# Patient Record
Sex: FEMALE | Race: WHITE
[De-identification: names, ages, dates, MRNs, and addresses within clinical notes are randomized per-mention and may not be internally consistent; named-entity substitution may affect disease eponyms.]

---

## 2017-01-10 NOTE — ER DOCUMENT REPORT
ED Fall





- General


Chief Complaint: Fall Injury


Stated Complaint: FALL/LEFT KNEE AND BACK PAIN


Time seen by provider: 20:53


Mode of Arrival: Wheelchair


Information source: Patient


Notes: 


This is a 46-year-old female with a history of chronic pain arthritis diabetes 

mellitus type II COPD anxiety bipolar hypothyroidism that presents today with 

left knee pain.  She states that at 1630 this afternoon she was walking to the 

grocery store and tripped over a rock.  She fell on her left knee despite 

extending both hands.  She denies wrist pain hitting her head or loss of 

consciousness.  She takes no blood thinners.  She is also complaining of 

thoracic spine and cervical spine pain.  Neck pain is constant as well as knee 

pain. 


TRAVEL OUTSIDE OF THE [place] IN LAST 30 DAYS: No





- Related data


Allergies/Adverse Reactions: 


 





buspirone HCl [From BuSpar] Allergy (Verified 10/20/16 07:33)


 


tramadol [Tramadol] Allergy (Verified 10/20/16 07:33)


 











Past Medical History





- General


Information source: Patient





- Social History


Smoking Status: Current Every Day Smoker


Family History: Reviewed & Not Pertinent


Patient has suicidal ideation: No


Patient has homicidal ideation: No





- Past Medical History


Cardiac Medical History: Reports: Hx Hypercholesterolemia, Hx Hypertension


   Denies: Hx Pulmonary Embolism


Pulmonary Medical History: Reports: Hx Bronchitis, Hx COPD


   Denies: Hx Tuberculosis


Endocrine Medical History: Reports: Hx Diabetes Mellitus Type 2, Hx 

Hypothyroidism


Musculoskeltal Medical History: Reports Hx Arthritis - chronic neck, lower back

, and knee pain, Reports Hx Musculoskeletal Trauma


Skin Medical History: Reports Hx MRSA, Reports Hx Psoriasis


Psychiatric Medical History: Reports: Hx Anxiety, Hx Attention Deficit 

Hyperactivity Disorder, Hx Bipolar Disorder, Hx Depression


Infectious Medical History: Reports: Hx MRSA


Past Surgical History: Reports: Hx  Section, Hx Cholecystectomy, Hx 

Gynecologic Surgery - ectopic pregnancy repair, Hx Orthopedic Surgery - carpal 

tunnel, Hx Tubal Ligation





- Immunizations


Immunizations up to date: Yes


Hx Diphtheria, Pertussis, Tetanus Vaccination: Yes





Review of Systems





- Review of Systems


Constitutional: denies: Chills, Fever


EENT: denies: Blurred vision


Cardiovascular: denies: Chest pain


Respiratory: denies: Cough, Hurts to breathe


Gastrointestinal: denies: Abdomen distended, Abdominal pain


Genitourinary: denies: Dysuria


Female Genitourinary: No symptoms reported


Musculoskeletal: See HPI


Skin: No symptoms reported


Hematologic/Lymphatic: No symptoms reported


Neurological/Psychological: No symptoms reported





Physical Exam





- Vital signs


Vitals: 


 











Temp Pulse Resp BP Pulse Ox


 


 98.0 F   113 H  18   125/85   100 


 


 01/10/17 18:49  01/10/17 18:49  01/10/17 18:49  01/10/17 18:49  01/10/17 18:49














- General


General appearance: Appears well, Alert


In distress: None





- HEENT


Head: Normocephalic, Atraumatic


Eyes: Normal


Conjunctiva: Normal





- Respiratory


Respiratory status: No respiratory distress.  No: Tachypnea


Breath sounds: Normal.  No: Rales, Rhonchi, Stridor, Wheezing





- Cardiovascular


Rhythm: Regular


Heart sounds: Normal auscultation





- Abdominal


Inspection: Normal


Bowel sounds: Normal


Tenderness: Nontender





- Back


Back: No: CVA tenderness





- Extremities


General upper extremity: Normal inspection


General lower extremity: Normal inspection - Peripheral pulses dorsalis pedis +

2 bilaterally normal sensation bilaterally





- Neurological


Cognition: Normal, Confused





- Psychological


Associated symptoms: Normal affect, Normal mood





- Skin


Skin Temperature: Warm


Skin Moisture: Dry


Skin Color: Normal





Course





- Re-evaluation


Re-evalutation: 





01/10/17 21:31


Patient's x-ray images results were shared with the patient.  She was given 

multiple opportunities to ask questions.  Patient had superficial abrasion to 

the left patellar region anterior.  I advised the patient to clean the wound at 

home.  Wound was not open or weeping nor bleeding.  I advised the patient to 

follow-up with primary care physician.





- Vital Signs


Vital signs: 


 











Temp Pulse Resp BP Pulse Ox


 


 98.2 F   95   18   144/72 H  96 


 


 01/10/17 21:45  01/10/17 21:45  01/10/17 21:45  01/10/17 21:45  01/10/17 21:45














Discharge





- Discharge


Clinical Impression: 


 Cervical spine pain, Thoracic spine pain





Left knee pain


Qualifiers:


 Chronicity: acute Qualified Code(s): M25.562 - Pain in left knee





Condition: Stable


Disposition: HOME, SELF-CARE


Additional Instructions: 


Return to the emergency department if symptoms worsen.  Follow-up with primary 

care physician as soon as possible.


Referrals: 


[facility] [Provider Group] - Follow up as needed

## 2017-01-10 NOTE — ER DOCUMENT REPORT
ED Medical Screen (RME)





- General


Chief Complaint: Fall Injury


Stated Complaint: FALL/LEFT KNEE AND BACK PAIN


Time seen by provider: 19:11


Mode of Arrival: Wheelchair


Information source: Patient


Notes: 


46-year-old female tripped over a rock and fell onto her bent left knee causing 

a bruise and an abrasion.  After she got up she developed pain in her neck and 

her upper back between her shoulder blades.


TRAVEL OUTSIDE OF THE [place] IN LAST 30 DAYS: No





- Related Data


Allergies/Adverse Reactions: 


 





buspirone HCl [From BuSpar] Allergy (Verified 10/20/16 07:33)


 


tramadol [Tramadol] Allergy (Verified 10/20/16 07:33)


 











Past Medical History





- Past Medical History


Cardiac Medical History: Reports: Hx Hypercholesterolemia, Hx Hypertension


   Denies: Hx Pulmonary Embolism


Pulmonary Medical History: Reports: Hx Bronchitis, Hx COPD


   Denies: Hx Tuberculosis


Endocrine Medical History: Reports: Hx Diabetes Mellitus Type 2, Hx 

Hypothyroidism


Musculoskeltal Medical History: Reports Hx Arthritis - chronic neck, lower back

, and knee pain, Reports Hx Musculoskeletal Trauma


Skin Medical History: Reports Hx MRSA, Reports Hx Psoriasis


Psychiatric Medical History: Reports: Hx Anxiety, Hx Attention Deficit 

Hyperactivity Disorder, Hx Bipolar Disorder, Hx Depression


Infectious Medical History: Reports: Hx MRSA


Past Surgical History: Reports: Hx  Section, Hx Cholecystectomy, Hx 

Gynecologic Surgery - ectopic pregnancy repair, Hx Orthopedic Surgery - carpal 

tunnel, Hx Tubal Ligation





- Immunizations


Immunizations up to date: Yes


Hx Diphtheria, Pertussis, Tetanus Vaccination: Yes





Physical Exam





- Vital signs


Vitals: 





 











Temp Pulse Resp BP Pulse Ox


 


 98.0 F   113 H  18   125/85   100 


 


 01/10/17 18:49  01/10/17 18:49  01/10/17 18:49  01/10/17 18:49  01/10/17 18:49














Course





- Vital Signs


Vital signs: 





 











Temp Pulse Resp BP Pulse Ox


 


 98.0 F   113 H  18   125/85   100 


 


 01/10/17 18:49  01/10/17 18:49  01/10/17 18:49  01/10/17 18:49  01/10/17 18:49

## 2017-01-29 NOTE — ER DOCUMENT REPORT
HPI





- HPI


Patient complains to provider of: foot pain


Onset: Other - Chronic, worse over the past 3 days


Onset/Duration: Worse


Quality of pain: Achy, Sharp


Pain Level: 3


Context: 


Patient complains of chronic foot pain that she suspects she may have diabetic 

neuropathy although has not been told that she does.  Patient states she does 

do a lot of walking though has not had any increase in the amount of walking 

over the past 3 days.  Patient complains of bilateral foot pain to the tops of 

her feet.  This is pain that she has had for several years now.


Associated Symptoms: Other


Exacerbated by: Walking - Foot pain


Relieved by: Denies


Similar symptoms previously: Yes


Recently seen / treated by doctor: No





- ROS


ROS below otherwise negative: Yes


Systems Reviewed and Negative: Yes All other systems reviewed and negative





- CONSTITUTIONAL


Constitutional: DENIES: Fever, Chills





- NEURO


Neurology: DENIES: Weakness





- RESPIRATORY


Respiratory: DENIES: Coughing





- GASTROINTESTINAL


Gastrointestinal: DENIES: Nausea





- REPRODUCTIVE


Reproductive: DENIES: Pregnant:





- MUSCULOSKELETAL


Musculoskeletal: REPORTS: Extremity pain





- DERM


Skin Color: Flushed


Skin Problems: Rash





Past Medical History





- General


Information source: Patient





- Social History


Smoking Status: Current Every Day Smoker


Frequency of alcohol use: Occasional


Drug Abuse: None


Occupation: none


Lives with: Spouse/Significant other


Family History: Reviewed & Not Pertinent


Patient has suicidal ideation: No


Patient has homicidal ideation: No





- Past Medical History


Cardiac Medical History: Reports: Hx Hypercholesterolemia, Hx Hypertension


   Denies: Hx Pulmonary Embolism


Pulmonary Medical History: Reports: Hx Bronchitis, Hx COPD


   Denies: Hx Tuberculosis


Endocrine Medical History: Reports: Hx Diabetes Mellitus Type 2, Hx 

Hypothyroidism


Renal/ Medical History: Denies: Hx Peritoneal Dialysis


Musculoskeltal Medical History: Reports Hx Arthritis - chronic neck, lower back

, and knee pain, Reports Hx Musculoskeletal Trauma


Skin Medical History: Reports Hx MRSA, Reports Hx Psoriasis


Psychiatric Medical History: Reports: Hx Anxiety, Hx Attention Deficit 

Hyperactivity Disorder, Hx Bipolar Disorder, Hx Depression


Infectious Medical History: Reports: Hx MRSA


Past Surgical History: Reports: Hx  Section, Hx Cholecystectomy, Hx 

Gynecologic Surgery - ectopic pregnancy repair, Hx Orthopedic Surgery - carpal 

tunnel, Hx Tubal Ligation





- Immunizations


Immunizations up to date: Yes


Hx Diphtheria, Pertussis, Tetanus Vaccination: Yes





Vertical Provider Document





- CONSTITUTIONAL


Agree With Documented VS: Yes


Exam Limitations: No Limitations


General Appearance: WD/WN, No Apparent Distress, Obese - Morbidly obese





- INFECTION CONTROL


TRAVEL OUTSIDE OF THE [place] IN LAST 30 DAYS: No





- HEENT


HEENT: Atraumatic, Normocephalic





- NECK


Neck: Normal Inspection





- RESPIRATORY


Respiratory: Breath Sounds Normal, No Respiratory Distress


O2 Sat by Pulse Oximetry: 100





- CARDIOVASCULAR


Cardiovascular: Regular Rate, Regular Rhythm


Pulses: Normal: Dorsalis pedis





- MUSCULOSKELETAL/EXTREMETIES


Musculoskeletal/Extremeties: MAEW, Tender - Patient with tenderness to dorsal 

aspect of bilateral feet





- NEURO


Level of Consciousness: Awake, Alert, Appropriate


Motor/Sensory: No Motor Deficit





- DERM


Integumentary: Warm, Dry, Rash - Psoriasis affecting bilateral upper and lower 

extremities as well as scattered areas to trunk





Course





- Re-evaluation


Re-evalutation: 





17 11:19


Patient advised that she needs further management of her psoriasis as well as 

her chronic foot pain.  Patient encouraged to follow-up with her primary care 

provider for any chronic pain management.





- Vital Signs


Vital signs: 


 











Temp Pulse Resp BP Pulse Ox


 


 98.3 F   79   22 H  155/91 H  100 


 


 17 10:04  17 10:04  17 10:04  17 10:04  17 10:04














Discharge





- Discharge


Clinical Impression: 


 Hx of essential hypertension





Chronic foot pain


Qualifiers:


 Laterality: unspecified laterality Qualified Code(s): M79.673 - Pain in 

unspecified foot





Condition: Stable


Disposition: HOME, SELF-CARE


Instructions:  Chronic Pain Control ([facility]), Oral Narcotic Medication ([facility]), 

Psoriasis ([facility]), Topical Steroid Cream or Ointment ([facility])


Additional Instructions: 


Return immediately for any new or worsening symptoms





Followup with your primary care provider, call tomorrow to make a followup 

appointment





You may need further testing and treatment for your psoriasis, your primary 

doctor may consider placing you on oral medication to manage your symptoms.  

Call them Monday for an appointment.





Your doctor may need to adjust your blood pressure medication as it is mildly 

elevated today.  Recheck with your doctor in 2 days for recheck.








Prescriptions: 


Hydrocodone/Acetaminophen [Norco 5-325 Tablet] 1 each PO Q4 PRN #15 tablet


 PRN Reason: 


Triamcinolone Acetonide [Aristocort 0.1% Cream] 1 applic TP TID #60 gm


Referrals: 


[facility] [Provider Group] - Follow up as needed


GUSTAVO LUGO DO [ACTIVE STAFF] - Follow up as needed


[facility] [Provider Group] - Follow up tomorrow
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
## 2017-02-10 NOTE — ER DOCUMENT REPORT
ED General





- General


Chief Complaint: Foot Pain


Stated Complaint: FOOT PAIN


Notes: 


Patient is a 46 show female presents for complaint of bilateral foot pain.  She 

does have a history of diabetic neuropathy.  She used to be on gabapentin but 

has not had it for a year because she's been without her insurance.  She's been 

to ER in the past for her foot pain.  She says it's a sharp pain is worse 

whenever anything touches her foot or if she puts her foot down on the ground.  

It occurs in both feet.  Pain is constant.  She also has severe psoriasis.  She 

is on any current medications for psoriasis.  She does have an upcoming 

appointment in March with the treatment caring to [facility] clinic to get back on 

her medications.  She does have a history of diabetes.  She says her blood 

sugars are only mildly elevated.  She says they never get too high.  She's not 

currently on medications.  She says it is more diet control.  No fevers.  No 

infections.  She also complains that she had a homeless person staying with 

her.  She wants to make sure she does not have scabies.  She's not had any 

scabies tracks are bites.


TRAVEL OUTSIDE OF THE [place] IN LAST 30 DAYS: No





- Related Data


Allergies/Adverse Reactions: 


 





buspirone HCl [From BuSpar] Allergy (Verified 17 10:01)


 


tramadol [Tramadol] Allergy (Verified 17 10:01)


 











Past Medical History





- Social History


Smoking Status: Current Every Day Smoker


Chew tobacco use (# tins/day): No


Frequency of alcohol use: None


Drug Abuse: None


Family History: Reviewed & Not Pertinent


Patient has suicidal ideation: No


Patient has homicidal ideation: No





- Past Medical History


Cardiac Medical History: Reports: Hx Hypercholesterolemia, Hx Hypertension


   Denies: Hx Pulmonary Embolism


Pulmonary Medical History: Reports: Hx Bronchitis, Hx COPD


   Denies: Hx Tuberculosis


Endocrine Medical History: Reports: Hx Diabetes Mellitus Type 2, Hx 

Hypothyroidism


Renal/ Medical History: Denies: Hx Peritoneal Dialysis


Musculoskeltal Medical History: Reports Hx Arthritis - chronic neck, lower back

, and knee pain, Reports Hx Musculoskeletal Trauma


Skin Medical History: Reports Hx MRSA, Reports Hx Psoriasis


Psychiatric Medical History: Reports: Hx Anxiety, Hx Attention Deficit 

Hyperactivity Disorder, Hx Bipolar Disorder, Hx Depression


Infectious Medical History: Reports: Hx MRSA


Past Surgical History: Reports: Hx Abdominal Surgery - LAPRASCOPY, Hx  

Section, Hx Cholecystectomy, Hx Gynecologic Surgery - ectopic pregnancy repair, 

Hx Oral Surgery, Hx Orthopedic Surgery - carpal tunnel, Hx Tubal Ligation





- Immunizations


Immunizations up to date: Yes


Hx Diphtheria, Pertussis, Tetanus Vaccination: Yes





Review of Systems





- Review of Systems


Notes: 


My Normal Review Basic





REVIEW OF SYSTEMS:


CONSTITUTIONAL :  Denies fever,  chills, or sweats.  Denies recent illness.


RESPIRATORY:  Denies cough, cold, or chest congestion.  Denies shortness of 

breath, difficulty breathing, or wheezing.


GASTROINTESTINAL:  Denies abdominal pain.  Denies nausea, vomiting, or 

diarrhea.  Denies constipation.  Last BM: 


MUSCULOSKELETAL: Pain in feet.


SKIN: Psoriasis


NEUROLOGICAL:  Denies altered mental status or loss of consciousness.  Denies 

headache.  Denies weakness or paralysis or loss of use of either side.  Denies 

problems with gait or speech.  Denies sensory or motor loss.


ALL OTHER SYSTEMS REVIEWED AND NEGATIVE.





Physical Exam





- Vital signs


Vitals: 


 











Temp Pulse Resp BP Pulse Ox


 


 97.5 F   95   17   151/94 H  98 


 


 02/10/17 03:06  02/10/17 03:06  02/10/17 03:06  02/10/17 03:06  02/10/17 03:06














- Notes


Notes: 


General Appearance: Well nourished, alert, cooperative, no acute distress, 

moderate obvious discomfort.


Vitals: reviewed, See vital signs table.


Head: no swelling or tenderness to the head


Eyes: PERRL, EOMI, Conjuctiva clear


Mouth: No decreasd moisture


Extremities: strength 5/5 in all extremities, good pulses in all extremities, 

mild pain to palpation of bilateral feet., no edema.


Skin: Moderate severe psoriasis over extremities and face.  No redness 

concerning for cellulitis.  No lesions concerning for scabies.


Neuro: speech clear, oriented x 3, normal affect, responds appropriately to 

questions.





Course





- Vital Signs


Vital signs: 


 











Temp Pulse Resp BP Pulse Ox


 


 97.5 F   95   17   151/94 H  98 


 


 02/10/17 03:06  02/10/17 03:06  02/10/17 03:06  02/10/17 03:06  02/10/17 03:06














- Transfer of Care


Notes: 





02/10/17 05:02


Patient is feeling improved.  She still has some pain but sinus appears and 

was.  I will send her home with pain medication.  I will start her on 

gabapentin.  I will write prescription for a walker says she does not have to 

use much pressure on her feet and this should help her pain.  Patient 

encouraged to keep an appointment with drinking the clinic and to try to get 

into [facility] sooner if she does get the money to be able to see them.  

Patient encouraged return to ER shows worsening or symptoms.  Patient agrees 

with plan and will be discharged home.





Dictation of this chart was performed using voice recognition software; 

therefore, there may be some unintended grammatical errors.





Discharge





- Discharge


Clinical Impression: 


 Foot pain, bilateral





Condition: Good


Disposition: HOME, SELF-CARE


Instructions:  Oral Narcotic Medication ([facility])


Additional Instructions: 


Please take the medications as prescribed.  Please do not mix and Norco with 

the Percocet.  Please follow-up with your appointment with the caring to the 

clinic.  Please follow-up with your physician at [facility] earlier if you 

aren't able to get the money to see them quicker.  Please return to ER if you 

have fevers, worsening of your symptoms, or if you have further concerns.


Prescriptions: 


Gabapentin 300 mg PO BID #30 capsule


Oxycodone HCl/Acetaminophen [Percocet 5-325 mg Tablet] 1 tab PO Q4H PRN #10 

tablet


 PRN Reason: 


Triamcinolone Acetonide 80 gm TP DAILY #1 cream.gm.


Walker [Folding Walker] 1 each MC ASDIR PRN #1 each


 PRN Reason:

## 2017-02-19 NOTE — ER DOCUMENT REPORT
ED Extremity Problem, Upper





- General


Chief Complaint: Shoulder Pain


Stated Complaint: RIGHT ARM PAIN


Time seen by provider: 04:53


Mode of Arrival: Medic


Information source: Patient


Notes: 


46-year-old female presented to ED for right shoulder pain that she states she 

came minutes was seen for last week and they gave her ibuprofen and told to use 

ice packs.  She stated she did not get told to follow up with orthopedics.


TRAVEL OUTSIDE OF THE [place] IN LAST 30 DAYS: No





- HPI


Patient complains to provider of: Pain, Shoulder - Right


Onset: Other - She states more than a week


Recent injury: Possibly


Where: Home


Quality of pain: Sharp


Severity of pain: Moderate


Pain Level: 4


Associated symptoms: None


Exacerbated by: Movement


Relieved by: Nothing


Similar symptoms previously: Yes


Recently seen / treated by doctor: Yes





- Related Data


Allergies/Adverse Reactions: 


 





buspirone HCl [From BuSpar] Allergy (Verified 17 10:01)


 


tramadol [Tramadol] Allergy (Verified 17 10:01)


 











Past Medical History





- General


Information source: Patient





- Social History


Smoking Status: Current Every Day Smoker


Cigarette use (# per day): Yes - 1 ppd


Chew tobacco use (# tins/day): No


Frequency of alcohol use: Rare


Drug Abuse: None


Lives with: Family


Family History: Reviewed & Not Pertinent


Patient has suicidal ideation: No


Patient has homicidal ideation: No





- Past Medical History


Cardiac Medical History: Reports: Hx Hypercholesterolemia, Hx Hypertension


Pulmonary Medical History: Reports: Hx Bronchitis, Hx COPD


EENT Medical History: Reports: None


Neurological Medical History: Reports: None


Endocrine Medical History: Reports: Hx Diabetes Mellitus Type 2, Hx 

Hypothyroidism


Renal/ Medical History: Reports: None


Malignancy Medical History: Reports: None


GI Medical History: Reports: None


Musculoskeltal Medical History: Reports Hx Arthritis - chronic neck, lower back

, and knee pain, Reports Hx Musculoskeletal Trauma


Skin Medical History: Reports Hx MRSA, Reports Hx Psoriasis


Psychiatric Medical History: Reports: Hx Anxiety, Hx Attention Deficit 

Hyperactivity Disorder, Hx Bipolar Disorder, Hx Depression


Traumatic Medical History: Reports: None


Infectious Medical History: Reports: Hx MRSA


Past Surgical History: Reports: Hx Abdominal Surgery - LAPRASCOPY, Hx  

Section, Hx Cholecystectomy, Hx Gynecologic Surgery - ectopic pregnancy repair, 

Hx Oral Surgery, Hx Orthopedic Surgery - carpal tunnel, Hx Tubal Ligation





- Immunizations


Immunizations up to date: Yes


Hx Diphtheria, Pertussis, Tetanus Vaccination: Yes





Review of Systems





- Review of Systems


Constitutional: No symptoms reported


EENT: No symptoms reported


Cardiovascular: No symptoms reported


Respiratory: No symptoms reported


Gastrointestinal: No symptoms reported


Genitourinary: No symptoms reported


Female Genitourinary: No symptoms reported


Musculoskeletal: Other - Right shoulder pain


Skin: No symptoms reported


Hematologic/Lymphatic: No symptoms reported


Neurological/Psychological: No symptoms reported


-: Yes All other systems reviewed and negative





Physical Exam





- Vital signs


Vitals: 


 











Temp Pulse Resp BP Pulse Ox


 


 97.9 F   90   18   157/79 H  98 


 


 17 02:55  17 02:55  17 02:55  17 02:55  17 02:55











Interpretation: Normal





- General


General appearance: Appears well, Alert





- HEENT


Head: Normocephalic, Atraumatic


Eyes: Normal


Pupils: PERRL





- Respiratory


Respiratory status: No respiratory distress


Chest status: Nontender


Breath sounds: Normal


Chest palpation: Normal





- Cardiovascular


Rhythm: Regular


Heart sounds: Normal auscultation


Murmur: No





- Abdominal


Inspection: Normal


Distension: No distension


Bowel sounds: Normal


Tenderness: Nontender


Organomegaly: No organomegaly





- Back


Back: Normal, Nontender





- Extremities


General upper extremity: Normal inspection, Normal color, Normal temperature


General lower extremity: Normal inspection, Nontender, Normal color, Normal ROM

, Normal temperature, Normal weight bearing.  No: Deana's sign


Shoulder: Tender - To palpation, Limited ROM - Patient states it hurts with 

range of motion but is able to complete range of motion.





- Neurological


Neuro grossly intact: Yes


Cognition: Normal


Orientation: AAOx4


[place]ie Coma Scale Eye Opening: Spontaneous


[place] Coma Scale Verbal: Oriented


[place]ie Coma Scale Motor: Obeys Commands


[place] Coma Scale Total: 15


Speech: Normal


Motor strength normal: LUE, RUE, LLE, RLE


Sensory: Normal





- Psychological


Associated symptoms: Normal affect, Normal mood





- Skin


Skin Temperature: Warm


Skin Moisture: Dry


Skin Color: Normal





Course





- Re-evaluation


Re-evalutation: 





17 05:29


When patient was informed I cannot give her narcotics for her shoulder pain but 

that I could re-x-rayed if she needed to but she really needed to follow-up 

with orthopedics for her shoulder pain she got angry and stormed out cursing me 

in the emergency room.  She stated that nonetheless we were doing except as she 

was using foul language.  Offered her ibuprofen or Tylenol she told me she didn'

t want any that medicine as it did not do her any good.  When I asked pain she 

needed to go to orthopedics for follow-up with this she said she can go to 

orthopedic [facility].  I explained to her that I 

shoulder pain that was hurting as she described it needed an orthopedic 

consult.  She is discharged home she needs to follow-up with orthopedic but 

states she is not going to.  I explained to her our policy on chronic pain and 

she became angry.





- Vital Signs


Vital signs: 


 











Temp Pulse Resp BP Pulse Ox


 


 97.9 F   90   18   157/79 H  98 


 


 17 02:55  17 02:55  17 02:55  17 02:55  17 02:55














Discharge





- Discharge


Clinical Impression: 


Right shoulder pain


Qualifiers:


 Chronicity: chronic Qualified Code(s): M25.511 - Pain in right shoulder





Chronic pain


Qualifiers:


 Chronic pain type: chronic pain syndrome Qualified Code(s): G89.4 - Chronic 

pain syndrome





Disposition: HOME, SELF-CARE


Instructions:  Exercise Program for the Shoulder (Formerly [facility], [facility])


Additional Instructions: 


Shoulder Injury





     You have injured your shoulder.  This usually results from stretching or 

tearing of the tendons during trauma.  Time and protection are required in 

order to heal properly.  Many injuries are quite disabling, and should be taken 

seriously.


     Initial treatment includes cold packs and a sling to rest the shoulder.  

The physician has assessed the seriousness of your injury, and has outlined a 

treatment plan.  Understand that this treatment may change, depending on how 

you progress.


     If a re-examination was recommended, it is important that you follow up as 

instructed.  Some shoulder injuries (such as partial tear of the rotator cuff) 

are only suspected after you've failed to improve.


Call us if there's severe pain, numbness, or loss of function.





Chronic Pain Control





     Stress, inactivity, and depression make pain more severe regardless of the 

cause of the pain.  Stress and poor physical condition can cause pain such as 

headaches and backache.


     Relaxation:  Rest in a quiet place with your eyes closed for 20 minutes 

twice daily.  Concentrate on a pleasant image, or simply "feel" your breathing.

  Clear your mind.


     Stress management:  Deal with your "stressors."  Either take action, or 

eliminate the stressor from your life.  Don't let things hang over you.  Accept 

those things you can't change.


     Nutrition:  Eat small, balanced meals -- don't skip, don't overeat.  Meals 

should be high-carbohydrate, low-sugar, low-fat.


     Exercise:  Exercise helps painful conditions and eases stress. Get 30 

minutes of moderate exercise, five days a week. Do an activity that does not 

flare your pain.


     Precautions:  Pain which continues to disrupt daily activities, or which 

changes in nature, requires a medical evaluation.  Pain Clinic referral is 

available.





     


We do not manage chronic pain in the Emergency Department.  We will try to 

appropriately help you through an acute flare of your chronic painful condition

, but for on-going chronic pain that does not improve, you will need to see 

your private doctor or pain management specialist.  We do not provide repeated 

medication management of chronic painful conditions.  If you wish, we can 

provide the name of local pain management physicians.





Acetaminophen





     Acetaminophen may be taken for pain relief or fever control. It's much 

safer than aspirin, offering a wider range of "safe" dosages.  It is safe 

during pregnancy.  Some brand names are Tylenol, Panadol, Datril, Anacin 3, 

Tempra, and Liquiprin. Acetaminophen can be repeated every four hours.  The 

following are maximum recommended dosages:





WEIGHT         Dose             Drops                  Elixir        Chewable(

80mg)


(LBS.)                            drprs=droppers    tsp=teaspoon


6                 40 mg            .4 ml (1/2)


6-11            80 mg            .8 ml (full)            1/2   tsp           1 

      tab


12-16         120 mg           1 1/2 drprs            3/4   tsp           1 1/2

  tabs


17-23         160 mg             2  drprs              1      tsp           2  

     tabs


24-30         240 mg             3  drprs              1 1/2 tsp           3   

    tabs


30-35         320 mg                                     2       tsp           

4       tabs


36-41         360 mg                                     2 1/4 tsp           4 1

/2  tabs


42-47         400 mg                                     2 1/2 tsp           5 

      tabs


48-53         480 mg                                     3       tsp          6

       tabs


54-59         520 mg                                     3  1/4 tsp          6 1

/2 tabs


60-64         560 mg                                     3  1/2 tsp          7 

     tabs 


65-70         600 mg                                     3  3/4 tsp          7 1

/2 tabs


71-76         640 mg                                     4       tsp           

8      tabs


77-82         720 mg                                     4 1/2  tsp           9

      tabs


83-88         800 mg                                     5       tsp           

10     tabs





>89 pounds or adults          650 mg to 900 mg 





Acetaminophen can be repeated every four hours. Maximum daily dose not to 

exceed 4000 mg.





   These maximum recommended dosages are slightly higher than the dosages 

written on the product container, but these dosages are very safe and well 

below the toxic dosage for acetaminophen.








Ibuprofen





     Ibuprofen is an excellent, safe drug for pain control.  In addition, it 

has potent antiinflammatory effects which are beneficial, especially in the 

treatment of injuries, arthritis, or tendonitis. It's best to take ibuprofen 

with food.  Persons with ulcer disease or allergy to aspirin should notify 

their physician of this before taking ibuprofen.


     Take the medication exactly as prescribed.  Don't take additional doses 

unless instructed to do so by your doctor.  If you develop wheezing, shortness 

of breath, hives, faintness, stomach pain, vomiting, or dark black stools, 

return for re-evaluation at once.





Ice Packs





     Apply ice packs frequently against the painful area.  Many different 

schedules are recommended, such as "20 minutes on, 20 minutes off" or "one hour 

ice, two hours rest."  If you need to work, you may need to go longer between 

ice treatments.  You should plan to have the area ice packed AT LEAST one 

fourth of the time.


     The ice should be applied over the wrap, tape, or splint, or over a layer 

of cloth -- not directly against the skin.  Some ice bags have a built-in cloth 

and can be put directly on the skin.





FOLLOW-UP CARE:


If you have been referred to a physician for follow-up care, call the physician

s office for an appointment as you were instructed or within the next two days.

  If you experience worsening or a significant change in your symptoms, notify 

the physician immediately or return to the Emergency Department at any time for 

re-evaluation.


Forms:  Elevated Blood Pressure, Smoking Cessation Education


Referrals: 


ZOE VALENCIA MD [ACTIVE STAFF] - Follow up as needed

## 2017-03-01 NOTE — ER DOCUMENT REPORT
ED Medical Screen (RME)





- General


Chief Complaint: Foot Pain


Stated Complaint: FOOT PAIN


Notes: 


45 yo female with hx/o neuropathy.  hurts to walk.  ran out of [employer].  [facility] 

[facility]  + hx/o DM


TRAVEL OUTSIDE OF THE [place] IN LAST 30 DAYS: No





- Related Data


Allergies/Adverse Reactions: 


 





buspirone HCl [From BuSpar] Allergy (Verified 17 10:01)


 


tramadol [Tramadol] Allergy (Verified 17 10:01)


 











Past Medical History





- Social History


Chew tobacco use (# tins/day): No


Frequency of alcohol use: None


Drug Abuse: None





- Past Medical History


Cardiac Medical History: Reports: Hx Hypercholesterolemia, Hx Hypertension


   Denies: Hx Pulmonary Embolism


Pulmonary Medical History: Reports: Hx Bronchitis, Hx COPD


   Denies: Hx Tuberculosis


Endocrine Medical History: Reports: Hx Diabetes Mellitus Type 2, Hx 

Hypothyroidism


Renal/ Medical History: Denies: Hx Peritoneal Dialysis


Musculoskeltal Medical History: Reports Hx Arthritis - chronic neck, lower back

, and knee pain, Reports Hx Musculoskeletal Trauma


Skin Medical History: Reports Hx MRSA, Reports Hx Psoriasis


Psychiatric Medical History: Reports: Hx Anxiety, Hx Attention Deficit 

Hyperactivity Disorder, Hx Bipolar Disorder, Hx Depression


Infectious Medical History: Reports: Hx MRSA


Past Surgical History: Reports: Hx Abdominal Surgery - LAPRASCOPY, Hx  

Section, Hx Cholecystectomy, Hx Gynecologic Surgery - ectopic pregnancy repair, 

Hx Oral Surgery, Hx Orthopedic Surgery - carpal tunnel, Hx Tubal Ligation





- Immunizations


Immunizations up to date: Yes


Hx Diphtheria, Pertussis, Tetanus Vaccination: Yes





Physical Exam





- Vital signs


Vitals: 





 











Temp Pulse Resp BP Pulse Ox


 


 97.9 F   87   20   144/90 H  100 


 


 17 11:48  17 11:48  17 11:48  17 11:48  17 11:48














Course





- Vital Signs


Vital signs: 





 











Temp Pulse Resp BP Pulse Ox


 


 97.9 F   87   20   144/90 H  100 


 


 17 11:48  17 11:48  17 11:48  17 11:48  17 11:48

## 2017-03-03 NOTE — ER DOCUMENT REPORT
ED General





- General


Chief Complaint: Foot Pain


Stated Complaint: FOOT PAIN


Mode of Arrival: Wheelchair


Information source: Patient


Notes: 


46-year-old female history of diabetic neuropathy presents with complaints of 

bilateral foot pain.  Patient notes she ran out of Neurontin requests refill.  

Denies any other concerns.  Patient has follow-up with family doctor within 20 

days


Before meals notes her blood sugar is well-controlled


TRAVEL OUTSIDE OF THE [place] IN LAST 30 DAYS: No





- HPI


Onset: Other


Onset/Duration: Persistent


Quality of pain: Achy


Severity: Mild


Pain Level: 2


Associated symptoms: Other


Exacerbated by: Movement, Walking


Relieved by: Denies


Similar symptoms previously: No


Recently seen / treated by doctor: No





- Related Data


Allergies/Adverse Reactions: 


 





buspirone HCl [From BuSpar] Allergy (Verified 17 11:18)


 


tramadol [Tramadol] Allergy (Verified 17 11:18)


 











Past Medical History





- Social History


Smoking Status: Current Every Day Smoker


Cigarette use (# per day): Yes


Chew tobacco use (# tins/day): No


Smoking Education Provided: No


Frequency of alcohol use: None


Drug Abuse: None


Family History: Reviewed & Not Pertinent


Patient has suicidal ideation: No


Patient has homicidal ideation: No





- Past Medical History


Cardiac Medical History: Reports: Hx Hypercholesterolemia, Hx Hypertension


   Denies: Hx Pulmonary Embolism


Pulmonary Medical History: Reports: Hx Bronchitis, Hx COPD


   Denies: Hx Tuberculosis


Endocrine Medical History: Reports: Hx Diabetes Mellitus Type 2, Hx 

Hypothyroidism


Renal/ Medical History: Denies: Hx Peritoneal Dialysis


Musculoskeltal Medical History: Reports Hx Arthritis - chronic neck, lower back

, and knee pain, Reports Hx Musculoskeletal Trauma


Skin Medical History: Reports Hx MRSA, Reports Hx Psoriasis


Psychiatric Medical History: Reports: Hx Anxiety, Hx Attention Deficit 

Hyperactivity Disorder, Hx Bipolar Disorder, Hx Depression


Infectious Medical History: Reports: Hx MRSA


Past Surgical History: Reports: Hx Abdominal Surgery - LAPRASCOPY, Hx  

Section, Hx Cholecystectomy, Hx Gynecologic Surgery - ectopic pregnancy repair, 

Hx Oral Surgery, Hx Orthopedic Surgery - carpal tunnel, Hx Tubal Ligation





- Immunizations


Immunizations up to date: Yes


Hx Diphtheria, Pertussis, Tetanus Vaccination: Yes





Review of Systems





- Review of Systems


Notes: 


REVIEW OF SYSTEMS:


CONSTITUTIONAL :  Denies fever,  chills, or sweats.  Denies recent illness.


EENT:   Denies eye, ear, throat, or mouth pain or symptoms.  Denies nasal or 

sinus congestion or discharge.  Denies throat, tongue, or mouth swelling or 

difficulty swallowing.


CARDIOVASCULAR:  Denies chest pain.  Denies palpitations or racing or irregular 

heart beat.  Denies ankle edema.


RESPIRATORY:  Denies cough, cold, or chest congestion.  Denies shortness of 

breath, difficulty breathing, or wheezing.


GASTROINTESTINAL:  Denies abdominal pain or distention.  Denies nausea, vomiting

, or diarrhea.  Denies blood in vomitus, stools, or per rectum.  Denies black, 

tarry stools.  Denies constipation. 


GENITOURINARY:  Denies difficulty urinating, painful urination, burning, 

frequency, blood in urine, or discharge.


FEMALE  GENITOURINARY:  Denies vaginal bleeding, heavy or abnormal periods, 

irregular periods.  Denies vaginal discharge or odor. 


MUSCULOSKELETAL: Bilateral foot pain


SKIN: Extensive psoriasis


HEMATOLOGIC :   Denies easy bruising or bleeding.


LYMPHATIC:  Denies swollen, enlarged glands.


NEUROLOGICAL:  Denies confusion or altered mental status.  Denies passing out 

or loss of consciousness.  Denies dizziness or lightheadedness.  Denies 

headache.  Denies weakness or paralysis or loss of use of either side.  Denies 

problems with gait or speech.  Denies sensory loss, numbness, or tingling.  

Denies seizures.


PSYCHIATRIC:  Denies anxiety or stress.  Denies depression, suicidal ideation, 

or homicidal ideation.





ALL OTHER SYSTEMS REVIEWED AND NEGATIVE.








Dictation was performed using Dragon voice recognition software 








PHYSICAL EXAMINATION:





GENERAL: Well-appearing, well-nourished and in no acute distress.





HEAD: Atraumatic, normocephalic.





EYES: Pupils equal round extraocular movements intact,  conjunctiva are normal.





ENT: Nares patent





NECK: Normal range of motion





LUNGS: No respiratory distress





Musculoskeletal: Normal range of motion subjective pain in bilateral lower 

extremities





NEUROLOGICAL:  Normal speech, normal gait. 





PSYCH: Normal mood, normal affect.





SKIN: Extensive psoriasis noted with no secondary sign of infection





Physical Exam





- Vital signs


Vitals: 


 











Temp Pulse Resp BP Pulse Ox


 


 98.2 F   99   20   138/91 H  97 


 


 17 11:14  17 11:14  17 11:14  17 11:14  17 11:14














Course





- Re-evaluation


Re-evalutation: 


Patient was placed back on her Neurontin at her request.  She'll be given follow

-up with primary care physician











After performing a Medical Screening Examination, I estimate there is LOW risk 

for OPEN FRACTURE, COMPARTMENT SYNDROME, TENDON RUPTURE, ACUTE NEUROVASCULAR 

INJURY, or RETAINED FOREIGN BODY, thus I consider the discharge disposition 

reasonable. Also, there is no evidence or peritonitis, sepsis, or toxicity. The 

patient and I have discussed the diagnosis and risks, and we agree with 

discharging home with close follow-up with the understanding that symptoms and 

presentations can change. We also discussed returning to the Emergency 

Department immediately if new or worsening symptoms occur. We have discussed 

the symptoms which are most concerning (e.g., changing or worsening pain, fever

, numbness, weakness, cool or painful digits) that necessitate immediate return.





- Vital Signs


Vital signs: 


 











Temp Pulse Resp BP Pulse Ox


 


 98.2 F   88   18   140/87 H  99 


 


 17 11:14  17 12:22  17 12:22  17 12:22  17 12:22














Discharge





- Discharge


Clinical Impression: 


 Neuropathy





Diabetes


Qualifiers:


 Diabetes mellitus type: type 1 Diabetes mellitus complication status: without 

complication Qualified Code(s): E10.9 - Type 1 diabetes mellitus without 

complications





Condition: Stable


Disposition: HOME, SELF-CARE


Instructions:  Neuropathy ([facility])


Prescriptions: 


Gabapentin [Neurontin 300 mg Capsule] 300 mg PO Q12 #60 capsule


Referrals: 


[facility] [Provider Group] - Follow up as needed

## 2017-03-03 NOTE — ER DOCUMENT REPORT
ED Medical Screen (RME)





- General


Stated Complaint: FOOT PAIN


Time seen by provider: 11:12


Notes: 


Patient states she has neuropathy in both feet.  Has been seen in the emergency 

room several times and has been given gabapentin.  She is currently out of 

prescription.  Has appointment on the 16th with the [facility].  

States unable to bear weight on her feet because of the pain.  No new injury.





I have greeted and performed a rapid initial assessment of this patient.  A 

comprehensive ED assessment and evaluation of the patient, analysis of test 

results and completion of the medical decision making process will be conducted 

by additional ED providers.


TRAVEL OUTSIDE OF THE [place] IN LAST 30 DAYS: No





- Related Data


Allergies/Adverse Reactions: 


 





buspirone HCl [From BuSpar] Allergy (Verified 17 11:18)


 


tramadol [Tramadol] Allergy (Verified 17 11:18)


 











Past Medical History





- Past Medical History


Cardiac Medical History: Reports: Hx Hypercholesterolemia, Hx Hypertension


   Denies: Hx Pulmonary Embolism


Pulmonary Medical History: Reports: Hx Bronchitis, Hx COPD


   Denies: Hx Tuberculosis


Endocrine Medical History: Reports: Hx Diabetes Mellitus Type 2, Hx 

Hypothyroidism


Renal/ Medical History: Denies: Hx Peritoneal Dialysis


Musculoskeltal Medical History: Reports Hx Arthritis - chronic neck, lower back

, and knee pain, Reports Hx Musculoskeletal Trauma


Skin Medical History: Reports Hx MRSA, Reports Hx Psoriasis


Psychiatric Medical History: Reports: Hx Anxiety, Hx Attention Deficit 

Hyperactivity Disorder, Hx Bipolar Disorder, Hx Depression


Infectious Medical History: Reports: Hx MRSA


Past Surgical History: Reports: Hx Abdominal Surgery - LAPRASCOPY, Hx  

Section, Hx Cholecystectomy, Hx Gynecologic Surgery - ectopic pregnancy repair, 

Hx Oral Surgery, Hx Orthopedic Surgery - carpal tunnel, Hx Tubal Ligation





- Immunizations


Immunizations up to date: Yes


Hx Diphtheria, Pertussis, Tetanus Vaccination: Yes





Physical Exam





- Vital signs


Vitals: 


 











Temp Pulse Resp BP Pulse Ox


 


 98.2 F   99   20   138/91 H  97 


 


 17 11:14  17 11:14  17 11:14  17 11:14  17 11:14














- Skin


Skin Temperature: Warm


Skin Moisture: Dry





Course





- Vital Signs


Vital signs: 


 











Temp Pulse Resp BP Pulse Ox


 


 98.2 F   99   20   138/91 H  97 


 


 17 11:14  17 11:14  17 11:14  17 11:14  17 11:14

## 2017-03-12 NOTE — EKG REPORT
SEVERITY:- BORDERLINE ECG -

SINUS RHYTHM

BORDERLINE PROLONGED QT INTERVAL

:

Confirmed by: Aaron Rodrigez 12-Mar-2017 20:04:45
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
## 2017-03-19 NOTE — ER DOCUMENT REPORT
ED General





- General


Chief Complaint: Eye Problem


Stated Complaint: EYE SWELLING


Mode of Arrival: Ambulatory


Information source: Patient


Notes: 


This is a 46-year-old female with a history of diabetes psoriasis prior history 

of MRSA who presents with a painful swollen bump above her left eye.  She 

states that the bump was present for about a week but that last night she began 

to pick at it and tried to squeeze something out of it.  This morning when she 

awoke she noticed some edema to the area.  She denies any fevers chills or 

systemic symptoms.  She otherwise feels well.


TRAVEL OUTSIDE OF THE [place] IN LAST 30 DAYS: No





- Related Data


Allergies/Adverse Reactions: 


 





buspirone HCl [From BuSpar] Allergy (Verified 17 10:24)


 


tramadol [Tramadol] Allergy (Verified 17 10:24)


 











Past Medical History





- General


Information source: Patient





- Social History


Smoking Status: Current Every Day Smoker


Chew tobacco use (# tins/day): No


Frequency of alcohol use: None


Drug Abuse: None


Family History: Reviewed & Not Pertinent


Patient has suicidal ideation: No


Patient has homicidal ideation: No





- Past Medical History


Cardiac Medical History: Reports: Hx Hypercholesterolemia, Hx Hypertension


   Denies: Hx Pulmonary Embolism


Pulmonary Medical History: Reports: Hx Bronchitis, Hx COPD


   Denies: Hx Tuberculosis


Endocrine Medical History: Reports: Hx Diabetes Mellitus Type 2, Hx 

Hypothyroidism


Renal/ Medical History: Denies: Hx Peritoneal Dialysis


Musculoskeltal Medical History: Reports Hx Arthritis - chronic neck, lower back

, and knee pain, Reports Hx Musculoskeletal Trauma


Skin Medical History: Reports Hx MRSA, Reports Hx Psoriasis


Psychiatric Medical History: Reports: Hx Anxiety, Hx Attention Deficit 

Hyperactivity Disorder, Hx Bipolar Disorder, Hx Depression


Infectious Medical History: Reports: Hx MRSA


Past Surgical History: Reports: Hx Abdominal Surgery - LAPRASCOPY, Hx  

Section, Hx Cholecystectomy, Hx Gynecologic Surgery - ectopic pregnancy repair, 

Hx Oral Surgery, Hx Orthopedic Surgery - carpal tunnel, Hx Tubal Ligation





- Immunizations


Immunizations up to date: Yes


Hx Diphtheria, Pertussis, Tetanus Vaccination: Yes





Review of Systems





- Review of Systems


Constitutional: No symptoms reported.  denies: Chills, Fever


EENT: See HPI.  denies: Blurred vision, Tearing, Nose congestion, Sinus pressure


Cardiovascular: No symptoms reported.  denies: Chest pain, Dyspnea


Respiratory: No symptoms reported.  denies: Cough, Short of breath


Gastrointestinal: No symptoms reported


Genitourinary: No symptoms reported


Musculoskeletal: No symptoms reported


Skin: See HPI, Other - chronic psoriasis


Neurological/Psychological: No symptoms reported





Physical Exam





- Vital signs


Vitals: 


 











Temp Pulse Resp BP Pulse Ox


 


 98.5 F   106 H  21 H  151/110 H  98 


 


 17 10:27  17 10:27  17 10:27  17 10:27  17 10:27














- Notes


Notes: 


PHYSICAL EXAMINATION:





GENERAL: Obese adult female, mildly disheveled.  Alert and conversant and in no 

acute distress





HEAD: Atraumatic, normocephalic.





EYES: Pupils equal round and reactive to light, extraocular movements intact 

without pain, sclera anicteric, conjunctiva are normal.  There is a firm 

erythematous papule just to the lateral edge of left eyebrow with mild 

surrounding edema towards the eyelid.  No fluctuance..  No purulent drainage.  





ENT: nares patent, oropharynx clear without exudates.  Moist mucous membranes.





NECK: Normal range of motion, supple without lymphadenopathy





LUNGS: Breath sounds clear to auscultation bilaterally and equal.  No wheezes 

rales or rhonchi.





HEART: Regular rate and rhythm without murmurs





ABDOMEN: Soft, obese, nontender, normoactive bowel sounds.  No guarding, no 

rebound. .





EXTREMITIES: Normal range of motion, diffuse scaly rash consistent with 

psoriasis.





NEUROLOGICAL: Cranial nerves grossly intact.  Normal speech, normal gait.  No 

gross focal motor or sensory deficits appreciated.





PSYCH: Normal mood, normal affect.











Course





- Re-evaluation


Re-evalutation: 





No evidence today of abscess that is amenable to I&D.  In fact today the 

swelling is quite minimal.  However given the patient's history of diabetes and 

prior MRSA will treat with antibiotics and mupirocin.  Patient states that she 

[facility] however she has run out of her 

gabapentin for her diabetic neuropathy in her feet and she is requesting a 

refill today.





Strict return precautions were discussed to include fever and worsening 

swelling.  She is comfortable with the plan.








- Vital Signs


Vital signs: 


 











Temp Pulse Resp BP Pulse Ox


 


 98.5 F   96   17   147/77 H  97 


 


 17 10:27  17 13:23  17 13:23  17 13:23  17 13:23














Discharge





- Discharge


Clinical Impression: 


 Cellulitis of left eyelid





Condition: Stable


Disposition: HOME, SELF-CARE


Additional Instructions: 


CELLULITIS:





     You have an infection of your skin and underlying soft tissues called 

cellulitis.  This is due to bacteria, which can enter through any break in the 

skin, or even through an irritated hair follicle.  Untreated, cellulitis will 

usually worsen.


     Antibiotics are required.  Usually, warm packs or warm soaks, and 

elevation of the infected area are recommended.  You should start getting 

better within 24 to 36 hours.


     Most infections respond quickly to the right medication. Follow-up care is 

important, however, to check for abscess (boil) formation, unsuspected foreign 

body, or resistant infection.


     If you develop fever, chills, or if the area of infection is becoming 

rapidly more swollen or painful, call the doctor at once.








MRSA CELLULITIS:





     You have an infection of your skin and underlying soft tissues called 

cellulitis.  This is due to bacteria, which can enter through any break in the 

skin, or even through an irritated hair follicle.  Untreated, cellulitis will 

usually worsen and may form an abscess which requires draining.


     Although many bacterial organisms can cause cellulitis and abscess 

formations, the most likely bacteria is Methicillin-Resistant Staph Aureus, or 

MRSA for short.  Antibiotics are required.  Usually, warm packs or warm soaks, 

and elevation of the infected area are recommended.  You should start getting 

better within 24 to 36 hours.


     Most infections respond quickly to the right medication. Follow-up care is 

important, however, to check for abscess (boil) formation, unsuspected foreign 

body, or resistant infection.


     If you develop fever, chills, or if the area of infection is becoming 

rapidly more swollen or painful, call the doctor at once.








ANTIBIOTIC THERAPY:


     You have been given an antibiotic prescription.  It's important that you 

take all the medication, unless instructed otherwise by your physician.  

Failure to complete the entire course can result in relapse of your condition.


     Common side effects of antibiotics include nausea, intestinal cramping, or 

diarrhea.  Women may develop vaginal yeast infections, and babies can get yeast 

(thrush) in the mouth following the use of antibiotics.  Contact your physician 

if you develop significant side effects from this medication.


     Allergy to this antibiotic can result in hives, wheezing, faintness, or 

itching.  If symptoms of allergy occur, stop the medication and call the doctor.











DOXYCYCLINE:


     Doxycycline (Vibramycin, Doryx) is an antibiotic of the tetracycline 

family.  This type of drug is useful for infections of the respiratory tract 

and genital tract, and is sometimes used for intestinal infections.


     Unlike most tetracyclines, doxycycline can be taken with food.  It is 

longer acting, and (usually) less prone to side effects than regular 

tetracycline.


     Tetracycline antibiotics can stain immature teeth and SHOULD NOT BE TAKEN 

BY CHILDREN, NURSING MOTHERS, OR PREGNANT WOMEN.


     Tetracyclines can make you more prone to sunburn.  Abdominal cramping, 

nausea, and diarrhea are occasional side effects.  Women may experience vaginal 

yeast infections.


     Call the doctor at once if you develop hives, itching, shortness of breath

, or lightheadedness.














FOLLOW-UP CARE:


If you have been referred to a physician for follow-up care, call the physician

s office for an appointment as you were instructed or within the next two days.

  If you experience worsening or a significant change in your symptoms, notify 

the physician immediately or return to the Emergency Department at any time for 

re-evaluation.


Prescriptions: 


Doxycycline Hyclate 100 mg PO BID #20 capsule


Gabapentin 300 mg PO TID #60 capsule


Mupirocin 22 gm TP TID #1 oint..gm.


Forms:  Elevated Blood Pressure, Smoking Cessation Education

## 2017-03-19 NOTE — ER DOCUMENT REPORT
ED Medical Screen (RME)





- General


Stated Complaint: EYE SWELLING


Notes: 


h/o MRSA


periorbital swelling for one week over her left eye, she tried to pop it last 

night without release of fluid. She states she went to bed and woke up with 

pain and swelling at the site








evidence of tender, erythematous area around the eye brow


no evidence of globe injury, trauma, EOMI





I have greeted and performed a rapid initial assessment of this patient. A 

comprehensive ED assessment and evaluation of the patient, analysis of test 

results and completion of the medical decision making process will be conducted 

by additional ED providers.





TRAVEL OUTSIDE OF THE [place] IN LAST 30 DAYS: No





- Related Data


Allergies/Adverse Reactions: 


 





buspirone HCl [From BuSpar] Allergy (Verified 17 10:24)


 


tramadol [Tramadol] Allergy (Verified 17 10:24)


 











Past Medical History





- Past Medical History


Cardiac Medical History: Reports: Hx Hypercholesterolemia, Hx Hypertension


   Denies: Hx Pulmonary Embolism


Pulmonary Medical History: Reports: Hx Bronchitis, Hx COPD


   Denies: Hx Tuberculosis


Endocrine Medical History: Reports: Hx Diabetes Mellitus Type 2, Hx 

Hypothyroidism


Renal/ Medical History: Denies: Hx Peritoneal Dialysis


Musculoskeltal Medical History: Reports Hx Arthritis - chronic neck, lower back

, and knee pain, Reports Hx Musculoskeletal Trauma


Skin Medical History: Reports Hx MRSA, Reports Hx Psoriasis


Psychiatric Medical History: Reports: Hx Anxiety, Hx Attention Deficit 

Hyperactivity Disorder, Hx Bipolar Disorder, Hx Depression


Infectious Medical History: Reports: Hx MRSA


Past Surgical History: Reports: Hx Abdominal Surgery - LAPRASCOPY, Hx  

Section, Hx Cholecystectomy, Hx Gynecologic Surgery - ectopic pregnancy repair, 

Hx Oral Surgery, Hx Orthopedic Surgery - carpal tunnel, Hx Tubal Ligation





- Immunizations


Immunizations up to date: Yes


Hx Diphtheria, Pertussis, Tetanus Vaccination: Yes

## 2017-03-20 NOTE — ER DOCUMENT REPORT
ED Medical Screen (RME)





- General


Stated Complaint: EYEBROW IRRITATION


Time seen by provider: 16:37


Mode of Arrival: Medic


Information source: Patient


Notes: 


46-year-old female presents to ED for irritation to the left eyelid.  She 

states she had a abscess there and she's popped it and now she's got pain.  She'

s got a history of MRSA.  She has been seen recently for the same irritation.




















I have greeted and performed a rapid initial assessment of this patient.  A 

comprehensive ED assessment and evaluation of the patient, analysis of test 

results and completion of medical decision making process will be conducted by 

an additional ED providers.


TRAVEL OUTSIDE OF THE [place] IN LAST 30 DAYS: No





- Related Data


Allergies/Adverse Reactions: 


 





buspirone HCl [From BuSpar] Allergy (Verified 17 10:24)


 


tramadol [Tramadol] Allergy (Verified 17 10:24)


 











Past Medical History





- Past Medical History


Cardiac Medical History: Reports: Hx Hypercholesterolemia, Hx Hypertension


   Denies: Hx Pulmonary Embolism


Pulmonary Medical History: Reports: Hx Bronchitis, Hx COPD


   Denies: Hx Tuberculosis


Endocrine Medical History: Reports: Hx Diabetes Mellitus Type 2, Hx 

Hypothyroidism


Renal/ Medical History: Denies: Hx Peritoneal Dialysis


Musculoskeltal Medical History: Reports Hx Arthritis - chronic neck, lower back

, and knee pain, Reports Hx Musculoskeletal Trauma


Skin Medical History: Reports Hx MRSA, Reports Hx Psoriasis


Psychiatric Medical History: Reports: Hx Anxiety, Hx Attention Deficit 

Hyperactivity Disorder, Hx Bipolar Disorder, Hx Depression


Infectious Medical History: Reports: Hx MRSA


Past Surgical History: Reports: Hx Abdominal Surgery - LAPRASCOPY, Hx  

Section, Hx Cholecystectomy, Hx Gynecologic Surgery - ectopic pregnancy repair, 

Hx Oral Surgery, Hx Orthopedic Surgery - carpal tunnel, Hx Tubal Ligation





- Immunizations


Immunizations up to date: Yes


Hx Diphtheria, Pertussis, Tetanus Vaccination: Yes

## 2017-03-20 NOTE — ER DOCUMENT REPORT
HPI





- HPI


Patient complains to provider of: tender eyebrow nodule


Onset: Other - Over 1 week


Onset/Duration: Persistent


Quality of pain: Achy


Pain Level: 5


Context: 


Patient complains of tender nodule to left brow that has been there for over 1 

week.  Patient states that she squeezed on the lesion yesterday and had 

purulent drainage.  Patient complains of tender palpable nodule to left brow.  

Patient states she was in emergency department yesterday for this complaint and 

was given a prescription for antibiotics.  Patient states she has not yet 

filled medicines as she is supposed to pick them up from the pharmacy tonight.


Associated Symptoms: Other - Left eyebrow pain.  denies: Fever


Exacerbated by: Other - Palpation


Relieved by: Denies


Similar symptoms previously: Yes


Recently seen / treated by doctor: Yes





- ROS


ROS below otherwise negative: Yes


Systems Reviewed and Negative: Yes All other systems reviewed and negative





- CONSTITUTIONAL


Constitutional: DENIES: Fever, Chills





- EENT


Notes: 


Left eyebrow tenderness





- GASTROINTESTINAL


Gastrointestinal: DENIES: Nausea, Patient vomiting





- REPRODUCTIVE


Reproductive: DENIES: Pregnant:





- DERM


Skin Color: Normal


Notes: 


Tender nodule to left brow





Past Medical History





- General


Information source: Patient





- Social History


Smoking Status: Current Every Day Smoker


Chew tobacco use (# tins/day): No


Frequency of alcohol use: None


Drug Abuse: None


Lives with: Spouse/Significant other


Family History: Reviewed & Not Pertinent


Patient has suicidal ideation: No


Patient has homicidal ideation: No





- Past Medical History


Cardiac Medical History: Reports: Hx Hypercholesterolemia, Hx Hypertension


   Denies: Hx Pulmonary Embolism


Pulmonary Medical History: Reports: Hx Bronchitis, Hx COPD


   Denies: Hx Tuberculosis


Endocrine Medical History: Reports: Hx Diabetes Mellitus Type 2, Hx 

Hypothyroidism


Renal/ Medical History: Denies: Hx Peritoneal Dialysis


Musculoskeltal Medical History: Reports Hx Arthritis - chronic neck, lower back

, and knee pain, Reports Hx Musculoskeletal Trauma


Skin Medical History: Reports Hx MRSA, Reports Hx Psoriasis


Psychiatric Medical History: Reports: Hx Anxiety, Hx Attention Deficit 

Hyperactivity Disorder, Hx Bipolar Disorder, Hx Depression


Infectious Medical History: Reports: Hx MRSA


Past Surgical History: Reports: Hx Abdominal Surgery - LAPRASCOPY, Hx  

Section, Hx Cholecystectomy, Hx Gynecologic Surgery - ectopic pregnancy repair, 

Hx Oral Surgery, Hx Orthopedic Surgery - carpal tunnel, Hx Tubal Ligation





- Immunizations


Immunizations up to date: Yes


Hx Diphtheria, Pertussis, Tetanus Vaccination: Yes





Vertical Provider Document





- CONSTITUTIONAL


Agree With Documented VS: Yes


Exam Limitations: No Limitations


General Appearance: WD/WN, No Apparent Distress





- INFECTION CONTROL


TRAVEL OUTSIDE OF THE [place] IN LAST 30 DAYS: No





- HEENT


HEENT: Atraumatic, Normocephalic


Notes: 


Tender nodular lesion to left lateral brow.  No surrounding erythema or 

swelling.  No concern for orbital or preseptal cellulitis





- NECK


Neck: Normal Inspection, Supple





- RESPIRATORY


Respiratory: Breath Sounds Normal, No Respiratory Distress


O2 Sat by Pulse Oximetry: 99





- CARDIOVASCULAR


Cardiovascular: Regular Rate, Regular Rhythm





- MUSCULOSKELETAL/EXTREMETIES


Musculoskeletal/Extremeties: MAEW





- NEURO


Level of Consciousness: Awake, Alert, Appropriate





- DERM


Integumentary: Warm, Dry, Rash - Rash distributed to bilateral upper and lower 

extremities, erythematous and scaling





Course





- Vital Signs


Vital signs: 


 











Temp Pulse Resp BP Pulse Ox


 


 98 F   102 H  22 H  148/75 H  99 


 


 17 18:13  17 18:13  17 18:13  17 18:13  17 18:13














Discharge





- Discharge


Clinical Impression: 


 Skin nodule, Elevated blood pressure reading





Condition: Stable


Disposition: HOME, SELF-CARE


Instructions:  Warm Packs ([facility]), Antibiotic Therapy ([facility])


Additional Instructions: 


Return immediately for any new or worsening symptoms





Followup with your primary care provider, call tomorrow to make a followup 

appointment





Get your prescriptions filled and take as prescribed.





Follow-up with a surgeon for further evaluation of cystic facial lesion


Referrals: 


[facility] [Provider Group] - Follow up as needed


[facility] - Follow up as needed

## 2017-03-23 NOTE — ER DOCUMENT REPORT
ED ENT





- General


Chief Complaint: Ear Pain


Stated Complaint: EAR PAIN


Time seen by provider: 21:37


Mode of Arrival: Ambulatory


Information source: Patient


Notes: 


46-year-old female presented to ED for bilateral ear pain this started 

yesterday.  Patient states that she has had a recent cold.  Patient has been 

here multiple times this month.


TRAVEL OUTSIDE OF THE [place] IN LAST 30 DAYS: No





- HPI


Patient complains to provider of: Ear problem, Nose problem


Onset: Yesterday


Onset/Duration: Gradual


Quality of pain: Sharp


Severity: Moderate


Pain Level: 4


Context: Recent Illness


Location of pain: Ears


Associated symptoms: Ear pain, Runny nose, Sinus drainage


Similar symptoms previously: Yes


Recently seen / treated by doctor: Yes





- Related Data


Allergies/Adverse Reactions: 


 





buspirone HCl [From BuSpar] Allergy (Verified 17 19:41)


 


tramadol [Tramadol] Allergy (Verified 17 19:41)


 











Past Medical History





- General


Information source: Patient





- Social History


Smoking Status: Current Every Day Smoker


Cigarette use (# per day): Yes - 1ppd


Chew tobacco use (# tins/day): No


Frequency of alcohol use: None


Drug Abuse: None


Family History: Reviewed & Not Pertinent





- Past Medical History


Cardiac Medical History: Reports: Hx Hypercholesterolemia, Hx Hypertension


Pulmonary Medical History: Reports: Hx Bronchitis, Hx COPD, Hx Sleep Apnea


EENT Medical History: Reports: None


Neurological Medical History: Reports: None


Endocrine Medical History: Reports: Hx Diabetes Mellitus Type 2, Hx 

Hypothyroidism


Renal/ Medical History: Reports: None


Malignancy Medical History: Reports: None


GI Medical History: Reports: None


Musculoskeltal Medical History: Reports Hx Arthritis - chronic neck, lower back

, and knee pain, Reports Hx Musculoskeletal Trauma


Skin Medical History: Reports Hx MRSA, Reports Hx Psoriasis


Psychiatric Medical History: Reports: Hx Anxiety, Hx Attention Deficit 

Hyperactivity Disorder, Hx Bipolar Disorder, Hx Depression


Traumatic Medical History: Reports: None


Infectious Medical History: Reports: Hx MRSA


Past Surgical History: Reports: Hx Abdominal Surgery - LAPRASCOPY, Hx  

Section, Hx Cholecystectomy, Hx Gynecologic Surgery - ectopic pregnancy repair, 

Hx Oral Surgery, Hx Orthopedic Surgery - carpal tunnel, Hx Tubal Ligation





- Immunizations


Immunizations up to date: Yes


Hx Diphtheria, Pertussis, Tetanus Vaccination: Yes





Review of Systems





- Review of Systems


Constitutional: Recent illness


EENT: Ear pain, Nose discharge, Sinus discharge


Cardiovascular: No symptoms reported


Respiratory: No symptoms reported


Gastrointestinal: No symptoms reported


Genitourinary: No symptoms reported


Female Genitourinary: No symptoms reported


Musculoskeletal: No symptoms reported


Skin: No symptoms reported


Hematologic/Lymphatic: No symptoms reported


Neurological/Psychological: No symptoms reported


-: Yes All other systems reviewed and negative





Physical Exam





- Vital signs


Vitals: 


 











Temp Pulse Resp BP Pulse Ox


 


 98.4 F   81   18   136/84 H  100 


 


 17 18:28  17 18:28  17 18:28  17 18:28  17 18:28











Interpretation: Normal





- General


General appearance: Appears well, Alert





- HEENT


Head: Normocephalic, Atraumatic


Eyes: Normal


Pupils: PERRL


Ears: Normal


External canal: Normal


Tympanic membrane: Normal


Sinus: Normal


Nasal: Swelling, Clear rhinorrhea


Mouth/Lips: Normal


Mucous membranes: Normal


Pharynx: Post nasal drainage


Neck: Normal





- Respiratory


Respiratory status: No respiratory distress


Chest status: Nontender


Breath sounds: Normal


Chest palpation: Normal





- Cardiovascular


Rhythm: Regular


Heart sounds: Normal auscultation


Murmur: No





- Abdominal


Inspection: Normal


Distension: No distension


Bowel sounds: Normal


Tenderness: Nontender


Organomegaly: No organomegaly





- Back


Back: Normal, Nontender





- Extremities


General upper extremity: Normal inspection, Nontender, Normal color, Normal ROM

, Normal temperature


General lower extremity: Normal inspection, Nontender, Normal color, Normal ROM

, Normal temperature, Normal weight bearing.  No: Deana's sign





- Neurological


Neuro grossly intact: Yes


Cognition: Normal


Orientation: AAOx4


[place] Coma Scale Eye Opening: Spontaneous


[place] Coma Scale Verbal: Oriented


[place] Coma Scale Motor: Obeys Commands


[place] Coma Scale Total: 15


Speech: Normal


Motor strength normal: LUE, RUE, LLE, RLE


Sensory: Normal





- Psychological


Associated symptoms: Normal affect, Normal mood





- Skin


Skin Temperature: Warm


Skin Moisture: Dry


Skin Color: Normal





Course





- Re-evaluation


Re-evalutation: 





17 23:08


Assessment consistent with upper respiratory infection no ear infections noted.

  Patient instructed to follow-up with her primary doctor or the free clinic as 

needed.  Patient instructed to use nasal saline and to keep up with her chronic 

medications.  Patient instructed to use Coricidin for her upper respiratory 

infection she was instructed she could get this at the drugstore just to go to 

the pharmacist and asked them for cold medicine for someone with blood pressure 

problems





- Vital Signs


Vital signs: 


 











Temp Pulse Resp BP Pulse Ox


 


 98.4 F   81   18   136/84 H  100 


 


 17 18:28  17 18:28  17 18:28  17 18:28  17 18:28














Discharge





- Discharge


Clinical Impression: 


 Otalgia, bilateral





Upper respiratory infection


Qualifiers:


 URI type: unspecified URI Qualified Code(s): J06.9 - Acute upper respiratory 

infection, unspecified





Condition: Stable


Disposition: HOME, SELF-CARE


Instructions:  Family Physicians / Practices


Additional Instructions: 


UPPER RESPIRATORY ILLNESS:





     You have a viral infection of the respiratory passages -- a "cold."  This 

common infection causes nasal congestion, drainage, and often sore throat and 

cough.  It is highly contagious.  The disease usually lasts about 10 to 14 days.


     There is no "cure" for the viral infection -- it must run its course.  If 

there is a complication, such as bacterial infection in the nose, sinuses, 

middle ear, or bronchial tubes, antibiotics may be required.  The antibiotics 

won't affect the virus.


     Drink plenty of fluids.  A humidifier may help.  An expectorant medication 

or decongestant may make you more comfortable.  Use acetaminophen or ibuprofen 

for fever or aches.


     See the doctor if fever persists over two days, if there is any 

significant worsening of your symptoms, or if you simply fail to improve as 

expected.





COUGH-SUPPRESSANT & EXPECTORANT MEDICATION:


     You are to use a cough medication as needed for relief of symptoms.  This 

medicine is a combination of an expectorant (to make the mucous thinner and 

more easily "coughed up") and a cough suppressant (to reduce the frequency of 

coughing).


     The cough-suppressant medicine is related to narcotics.  You may 

experience mild nausea and sleepiness.  Some patients who are very sensitive to 

narcotics may have stomach pain from this medicine. Taking the medicine with 

food reduces these side effects.  Do not drive or work with machinery until you 

know how this medicine affects you.


     The expectorant should have no side effects.  Iodine-containing 

expectorants (such as organidin) should not be taken by persons with active 

thyroid disease unless approved by your doctor.


   





USE OF ACETAMINOPHEN (Tylenol):


     Acetaminophen may be taken for pain relief or fever control. It's much 

safer than aspirin, offering a wider range of "safe" dosages.  It is safe 

during pregnancy.  Some brand names are Tylenol, Panadol, Datril, Anacin 3, 

Tempra, and Liquiprin. Acetaminophen can be repeated every four hours.  The 

following are maximum recommended dosages:


>89 pounds or adults          650 mg to 900 mg


Acetaminophen can be repeated every four hours.  Maximum dose not to exceed 

4000 mg a day.








SMOKING:


     If you smoke, you should stop smoking.  The tar and chemicals in cigarette 

smoke are harmful.  Smoking has been shown to cause:


          emphysema


          chronic bronchitis


          lung cancer


          mouth and throat cancer


          stomach and pancreas cancer


          premature aging


          birth defects


     In addition, smoking increases ear and lung infections in children of 

smokers.








FOLLOW-UP CARE:


If you have been referred to a physician for follow-up care, call the physician

s office for an appointment as you were instructed or within the next two days.

  If you experience worsening or a significant change in your symptoms, notify 

the physician immediately or return to the Emergency Department at any time for 

re-evaluation.





Forms:  Elevated Blood Pressure

## 2017-03-31 NOTE — ER DOCUMENT REPORT
ED Skin Rash/Insect Bite/Abscs





- General


Mode of Arrival: Medic


Information source: Patient, [facility] Records


TRAVEL OUTSIDE OF THE [place] IN LAST 30 DAYS: No





- HPI


Patient complains to provider of: Skin rash/lesion


Onset/Duration: Gradual, Persistent


Quality of rash: Itchy, Painful





- General


Chief Complaint: Itching


Stated Complaint: PAIN AND ITCHING ON LEGS


Notes: 


Patient is a 46-year-old female smoker presenting to the emergency department 

via ambulance due to a new rash on her body that is causing her extensive pain 

and itching.  Patient states that she has a history of psoriasis and she 

normally uses over-the-counter cream to treat it, but her boyfriend is in halfway 

right now, so she states that she can't go get it.  Patient also states that 

her feet are hurting badly due to her neuropathy.  The patient has a history of 

diabetes, which she says is diet controlled.  Patient used to take metformin, 

but she has not taken it in over a year.  Patient states that [facility]. (JAELYN LOPEZ)





- Related Data


Allergies/Adverse Reactions: 


 





buspirone HCl [From BuSpar] Allergy (Verified 17 19:41)


 


tramadol [Tramadol] Allergy (Verified 17 19:41)


 











Past Medical History





- General


Information source: Patient, [facility] Records





- Social History


Smoking Status: Current Every Day Smoker


Cigarette use (# per day): Yes


Frequency of alcohol use: None


Family History: Reviewed & Not Pertinent





- Past Medical History


Cardiac Medical History: Reports: Hx Hypercholesterolemia, Hx Hypertension


   Denies: Hx Pulmonary Embolism


Pulmonary Medical History: Reports: Hx Bronchitis, Hx COPD, Hx Sleep Apnea


   Denies: Hx Tuberculosis


Endocrine Medical History: Reports: Hx Diabetes Mellitus Type 2, Hx 

Hypothyroidism


Renal/ Medical History: Denies: Hx Peritoneal Dialysis


Musculoskeltal Medical History: Reports Hx Arthritis - chronic neck, lower back

, and knee pain, Reports Hx Musculoskeletal Trauma, Reports Other - Neuropathy


Skin Medical History: Reports Hx MRSA, Reports Hx Psoriasis


Psychiatric Medical History: Reports: Hx Anxiety, Hx Attention Deficit 

Hyperactivity Disorder, Hx Bipolar Disorder, Hx Depression


Infectious Medical History: Reports: Hx MRSA


Past Surgical History: Reports: Hx Abdominal Surgery - Laparoscopy, Hx  

Section, Hx Cholecystectomy, Hx Gynecologic Surgery - Ectopic pregnancy repair, 

Hx Oral Surgery, Hx Orthopedic Surgery - Carpal tunnel, Hx Tubal Ligation





- Immunizations


Immunizations up to date: Yes


Hx Diphtheria, Pertussis, Tetanus Vaccination: Yes





Review of Systems





- Review of Systems


Constitutional: No symptoms reported


EENT: No symptoms reported


Cardiovascular: No symptoms reported


Respiratory: No symptoms reported


Gastrointestinal: No symptoms reported


Genitourinary: No symptoms reported


Female Genitourinary: No symptoms reported


Musculoskeletal: See HPI, Other - Bilateral foot pain


Skin: See HPI, Rash


Hematologic/Lymphatic: No symptoms reported


Neurological/Psychological: No symptoms reported


-: Yes All other systems reviewed and negative





Physical Exam





- General


General appearance: Alert





- HEENT


Head: Normocephalic, Atraumatic


Eyes: Normal


Pupils: PERRL





- Respiratory


Respiratory status: No respiratory distress


Chest status: Nontender


Breath sounds: Normal


Chest palpation: Normal





- Cardiovascular


Rhythm: Regular


Heart sounds: Normal auscultation


Murmur: No





- Abdominal


Inspection: Obese


Tenderness: Nontender





- Back


Back: Normal, Nontender





- Extremities


General upper extremity: Other - See skin exam


General lower extremity: Other - See skin exam





- Neurological


Neuro grossly intact: Yes


Cognition: Normal


[place] Coma Scale Eye Opening: Spontaneous


[place] Coma Scale Verbal: Oriented


[place] Coma Scale Motor: Obeys Commands


[place] Coma Scale Total: 15





- Psychological


Associated symptoms: Anxious, Tearful - Whining





- Skin


Skin Temperature: Warm


Skin Moisture: Dry


Skin irregularity: other - Dry, thickened, erythematous skin over extremities 

and neck.  Seems to be irritated from scratching.  Nothing between the fingers.


Location of irregularity: Neck, Extremities





- Vital signs


Vitals: 


 











Pulse Resp BP Pulse Ox


 


 98   19   121/73   99 


 


 17 03:45  17 03:45  17 03:45  17 03:45














Discharge





- Discharge


Clinical Impression: 


 Neuropathy





Eczema


Qualifiers:


 Eczema type: unspecified Qualified Code(s): L30.9 - Dermatitis, unspecified





Additional Instructions: 


Your exam suggests that the skin problem you're having now is probably eczema.


It is usually quickly brought under control with steroid cream.


Long-term treatment requires that you use moisturizing lotions on your skin 

every day.


The prednisone you will be prescribed will make your blood sugars go up some, 

so you should drink plenty of water throughout the day while you're taking this 

medicine.


You received today's dose of prednisone in the emergency room, start the 

prescription prednisone on Saturday morning(tomorrow).


Follow-up with a local medical doctor to manage your medical problems.


Prescriptions: 


Prednisone [Deltasone 10 mg Tablet] 10 mg PO ASDIR PRN #21 tablet


 PRN Reason: 


Triamcinolone Acetonide 80 gm TP BID PRN #80 gm


 PRN Reason: 


Scribe Attestation: 





17 07:05


I personally performed the services described in the documentation, reviewed 

and edited the documentation which was dictated to the scribe in my presence, 

and it accurately records my words and actions. (ASA HOOD)





Scribe Documentation





- Scribe


Written by Tonya:: Jaelyn Lopez 2017 0705


acting as scribe for :: Oxana

## 2017-04-23 NOTE — ER DOCUMENT REPORT
HPI





- REPRODUCTIVE


Reproductive: DENIES: Pregnant:





Past Medical History





- Social History


Family History: Reviewed & Not Pertinent





- Past Medical History


Cardiac Medical History: Reports: Hx Hypercholesterolemia, Hx Hypertension


   Denies: Hx Pulmonary Embolism


Pulmonary Medical History: Reports: Hx Bronchitis, Hx COPD, Hx Sleep Apnea


   Denies: Hx Tuberculosis


Endocrine Medical History: Reports: Hx Diabetes Mellitus Type 2, Hx 

Hypothyroidism


Renal/ Medical History: Denies: Hx Peritoneal Dialysis


Musculoskeltal Medical History: Reports Hx Arthritis - chronic neck, lower back

, and knee pain, Reports Hx Musculoskeletal Trauma


Skin Medical History: Reports Hx MRSA, Reports Hx Psoriasis


Psychiatric Medical History: Reports: Hx Anxiety, Hx Attention Deficit 

Hyperactivity Disorder, Hx Bipolar Disorder, Hx Depression


Infectious Medical History: Reports: Hx MRSA


Past Surgical History: Reports: Hx Abdominal Surgery - Laparoscopy, Hx  

Section, Hx Cholecystectomy, Hx Gynecologic Surgery - Ectopic pregnancy repair, 

Hx Oral Surgery, Hx Orthopedic Surgery - Carpal tunnel, Hx Tubal Ligation





- Immunizations


Immunizations up to date: Yes


Hx Diphtheria, Pertussis, Tetanus Vaccination: Yes





Vertical Provider Document





- INFECTION CONTROL


TRAVEL OUTSIDE OF THE [place] IN LAST 30 DAYS: No

## 2017-04-23 NOTE — ER DOCUMENT REPORT
ED General





- General


Chief Complaint: Abscess


Stated Complaint: POSSIBLE ABSCESS


Notes: 


Patient is a 46-year-old female with past medical history of recurrent MRSA 

abscesses who presents with an abscess both on her left inner thigh and left 

proximal upper extremity.  States that these areas are mildly painful with a 

dull, constant, throbbing pain.  Nothing improves or worsens the pain.  She has 

not seen her primary doctor regarding today's concerns.  She has had similar 

symptoms in the past.  Denies any spreading redness from the area fever or 

constitutional symptoms.


TRAVEL OUTSIDE OF THE [place] IN LAST 30 DAYS: No





- Related Data


Allergies/Adverse Reactions: 


 





buspirone HCl [From BuSpar] Allergy (Verified 17 20:19)


 


tramadol [Tramadol] Allergy (Verified 17 20:19)


 











Past Medical History





- General


Information source: Patient





- Social History


Smoking Status: Current Every Day Smoker


Frequency of alcohol use: None


Drug Abuse: None


Lives with: Family


Family History: Reviewed & Not Pertinent


Patient has suicidal ideation: No


Patient has homicidal ideation: No





- Past Medical History


Cardiac Medical History: Reports: Hx Hypercholesterolemia, Hx Hypertension


   Denies: Hx Pulmonary Embolism


Pulmonary Medical History: Reports: Hx Bronchitis, Hx COPD, Hx Sleep Apnea


   Denies: Hx Tuberculosis


Endocrine Medical History: Reports: Hx Diabetes Mellitus Type 2, Hx 

Hypothyroidism


Renal/ Medical History: Denies: Hx Peritoneal Dialysis


Musculoskeltal Medical History: Reports Hx Arthritis - chronic neck, lower back

, and knee pain, Reports Hx Musculoskeletal Trauma


Skin Medical History: Reports Hx MRSA, Reports Hx Psoriasis


Psychiatric Medical History: Reports: Hx Anxiety, Hx Attention Deficit 

Hyperactivity Disorder, Hx Bipolar Disorder, Hx Depression


Infectious Medical History: Reports: Hx MRSA


Past Surgical History: Reports: Hx Abdominal Surgery - Laparoscopy, Hx  

Section, Hx Cholecystectomy, Hx Gynecologic Surgery - Ectopic pregnancy repair, 

Hx Oral Surgery, Hx Orthopedic Surgery - Carpal tunnel, Hx Tubal Ligation





- Immunizations


Immunizations up to date: Yes


Hx Diphtheria, Pertussis, Tetanus Vaccination: Yes





Review of Systems





- Review of Systems


Notes: 


Constitutional: Negative for fever.


HENT: Negative for sore throat.


Eyes: Negative for visual changes.


Cardiovascular: Negative for chest pain.


Respiratory: Negative for shortness of breath.


Gastrointestinal: Negative for abdominal pain, vomiting or diarrhea.


Genitourinary: Negative for dysuria.


Musculoskeletal: Negative for back pain.


Skin: Positive for multiple abscesses


Neurological: Negative for headaches, weakness or numbness.





10 point ROS negative except as marked above and in HPI.





Physical Exam





- Vital signs


Vitals: 


 











Temp Pulse Resp BP Pulse Ox


 


 98.5 F   77   18   161/83 H  98 


 


 17 20:19  17 20:19  17 20:19  17 20:19  17 20:19











Interpretation: Normal


Notes: 


PHYSICAL EXAMINATION:





GENERAL: Well-appearing, well-nourished and in no acute distress.





HEAD: Atraumatic, normocephalic.





EYES: Pupils equal round and reactive to light, extraocular movements intact, 

sclera anicteric, conjunctiva are normal.





ENT: nares patent, oropharynx clear without exudates.  Moist mucous membranes.





NECK: Normal range of motion, supple without lymphadenopathy





LUNGS: Breath sounds clear to auscultation bilaterally and equal.  No wheezes 

rales or rhonchi.





HEART: Regular rate and rhythm without murmurs





ABDOMEN: Soft, nontender, normoactive bowel sounds.  No guarding, no rebound.  

No masses appreciated.





EXTREMITIES: Normal range of motion, no pitting or edema.  No cyanosis.





NEUROLOGICAL: No focal neurological deficits. Moves all extremities 

spontaneously and on command.





PSYCH: Normal mood, normal affect.





SKIN: Warm, Dry, normal turgor, diffuse psoriasis.  2 small abscesses one on 

the proximal left central line with no surrounding cellulitis and the other is 

on the proximal left upper extremity just above the elbow





Course





- Re-evaluation


Re-evalutation: 





17 22:26


Patient presents with 2 small abscesses one on her left inner thigh and one on 

her left proximal upper extremity.  These were incised and drained without 

difficulty at the bedside.  She was started on Bactrim for 5 days.  No 

surrounding cellulitis.  Vitals otherwise within normal limits.  She does not 

appear septic or toxic.At this time will discharge with return precautions and 

follow-up recommendations.  Verbal discharge instructions given a the bedside 

and opportunity for questions given. Medication warnings reviewed. Patient is 

in agreement with this plan and has verbalized understanding of return 

precautions and the need for primary care follow-up in the next 24-72 hours.





- Vital Signs


Vital signs: 


 











Temp Pulse Resp BP Pulse Ox


 


 98.5 F   76   16   118/59 L  97 


 


 17 20:19  17 23:04  17 23:04  17 23:04  17 23:04














Procedures





- Incision and Drainage


  ** Left Arm


Type: Simple


Anesthetic type: 1% Lidocaine


mL's of anesthetic: 2


Blade size: 11


I&D procedure: Chlorprep applied


Incision Method: Incision made by scalpel


Amount/type of drainage: 1 mL of purulent drainage





Discharge





- Discharge


Clinical Impression: 


 Abscess





Condition: Good


Disposition: HOME, SELF-CARE


Additional Instructions: 


You were seen for an abscess that required drainage. Please clean this area 

with soap and water twice daily and apply a topical antibiotic. Dress the area 

after each cleaning.  Please return if you develop fever, vomiting, the pain at 

the site worsens, you notice spreading redness from the area, or you have any 

other symptoms that are concerning to you.


Prescriptions: 


Sulfamethoxazole/Trimethoprim [Bactrim Ds Tablet] 2 tab PO BID #20 tablet


Referrals: 


[facility] [Primary Care Provider] - Follow up as needed

## 2017-04-29 NOTE — ER DOCUMENT REPORT
ED Oral Problem





- General


Chief Complaint: Mouth Problem


Stated Complaint: MOUTH SORE


Mode of Arrival: Ambulatory


Information source: Patient


TRAVEL OUTSIDE OF THE [place] IN LAST 30 DAYS: No





- HPI


Patient complains to provider of: Other - Gum pain


Notes: 


Patient arrives with complaints of left lower gum pain for the last 2 days.  

States that there is a white spot on her gum that is painful.  This is caused 

her to have difficulty eating.  The pain is worse with eating or chewing, 

nothing seems to make it better.  She's had no fever.  No nausea, vomiting, 

diarrhea.  No difficulty breathing or swallowing.  She denies any other 

complaints at this time.





- Related Data


Allergies/Adverse Reactions: 


 





buspirone HCl [From BuSpar] Allergy (Verified 17 17:06)


 


tramadol [Tramadol] Allergy (Verified 17 17:06)


 











Past Medical History





- Social History


Smoking Status: Unknown if Ever Smoked


Family History: Reviewed & Not Pertinent


Patient has suicidal ideation: No


Patient has homicidal ideation: No





- Past Medical History


Cardiac Medical History: Reports: Hx Hypercholesterolemia, Hx Hypertension


   Denies: Hx Pulmonary Embolism


Pulmonary Medical History: Reports: Hx Bronchitis, Hx COPD, Hx Sleep Apnea


   Denies: Hx Tuberculosis


Endocrine Medical History: Reports: Hx Diabetes Mellitus Type 2, Hx 

Hypothyroidism


Renal/ Medical History: Denies: Hx Peritoneal Dialysis


Musculoskeltal Medical History: Reports Hx Arthritis - chronic neck, lower back

, and knee pain, Reports Hx Musculoskeletal Trauma


Skin Medical History: Reports Hx MRSA, Reports Hx Psoriasis


Psychiatric Medical History: Reports: Hx Anxiety, Hx Attention Deficit 

Hyperactivity Disorder, Hx Bipolar Disorder, Hx Depression


Infectious Medical History: Reports: Hx MRSA


Past Surgical History: Reports: Hx Abdominal Surgery - Laparoscopy, Hx  

Section, Hx Cholecystectomy, Hx Gynecologic Surgery - Ectopic pregnancy repair, 

Hx Oral Surgery, Hx Orthopedic Surgery - Carpal tunnel, Hx Tubal Ligation





- Immunizations


Immunizations up to date: Yes


Hx Diphtheria, Pertussis, Tetanus Vaccination: Yes





Review of Systems





- Review of Systems


-: Yes All other systems reviewed and negative





Physical Exam





- Vital signs


Vitals: 





 











Temp Pulse Resp BP Pulse Ox


 


 98.8 F   86   19   149/71 H  99 


 


 17 17:08  17 17:08  17 17:08  17 17:08  17 17:08














- Notes


Notes: 


GENERAL: alert, cooperative, nontoxic, no distress.


HEAD: normocephalic, atraumatic


EYES: conjunctiva pink without discharge, no external redness or swelling.


EARS: no external swelling, no external redness.  TMs pearly gray bilaterally 

with normal landmarks and no perforation.


NOSE: atraumatic, no external swelling


MOUTH/THROAT: mucous membranes moist and pink.  Patient known to have a 

slightly tender area to the left lower posterior gumline.  No fluctuant abscess 

noted.  There is no sublingual swelling or induration, no signs of Tariq's 

angina.  No facial or jaw swelling.


NECK: soft, supple, full range of motion, no meningismus.  Swelling, no stridor.


CHEST: no distress, lungs clear and equal throughout.  No wheezing, rales, 

rhonchi.


CARDIAC: regular rate and rhythm, no murmur, normal capillary refill, normal 

pulses.  


BACK: full range of motion, no CVA tenderness.


EXTREMITIES: full range of motion of all extremities.  No redness, no swelling.


NEURO: alert and oriented 3, no focal deficits, full range of motion of all 

extremities.


PYSCH: appropriate mood, affect.  Patient is cooperative.


SKIN: pink, warm, dry, no rash.





Course





- Re-evaluation


Re-evalutation: 





17 17:46


Patient is nontoxic with stable vitals.  The patient has a tender white area to 

the left lower posterior gumline.  There is no drainable abscess noted.  No 

sign Tariq's angina.  She is in no distress.  No discharge home on pen VK and 

Voltaren.  Follow-up for worsening pain, fever, developed breathing or 

swallowing, or any further concerns.





The patient is noted to have elevated blood pressure during today's emergency 

department visit.  The patient was informed of this finding.  The patient was 

instructed that this may be related to pre-hypertension and requires further 

evaluation with a primary care provider.  The patient has no hypertensive 

symptoms at this time.





The patient's emergency department workup and current diagnosis were explained 

to the patient and or family.  Follow-up instructions were provided.  

Medications if prescribed were discussed. Instructions for when to return to 

the emergency department including specific  worrisome symptoms were discussed 

with the patient and/or family.





- Vital Signs


Vital signs: 





 











Temp Pulse Resp BP Pulse Ox


 


 98.8 F   86   19   149/71 H  99 


 


 17 17:08  17 17:08  17 17:08  17 17:08  17 17:08














Discharge





- Discharge


Clinical Impression: 


 Pain in gums





Condition: Stable


Disposition: HOME, SELF-CARE


Instructions:  Dentist, Dental Infection or Abscess ([facility])


Additional Instructions: 


Take medications as prescribed.  Follow up with a dentist at the next available 

appointment.  Follow sooner for increased pain, fever, difficult to breathing 

or swallowing, or any further concerns.





Your blood pressure was elevated during today's visit.  Have this rechecked 

with your doctor.


Prescriptions: 


Diclofenac Sodium [Voltaren] 75 mg PO BID #20 tablet.


Penicillin V Potassium [Penicillin Vk 500 mg Tablet] 500 mg PO QID #28 tablet


Forms:  Elevated Blood Pressure

## 2017-05-05 ENCOUNTER — HOSPITAL ENCOUNTER (EMERGENCY)
Dept: HOSPITAL 62 - ER | Age: 47
Discharge: HOME | End: 2017-05-05
Payer: SELF-PAY

## 2017-05-05 VITALS — DIASTOLIC BLOOD PRESSURE: 79 MMHG | SYSTOLIC BLOOD PRESSURE: 134 MMHG

## 2017-05-05 DIAGNOSIS — E78.00: ICD-10-CM

## 2017-05-05 DIAGNOSIS — E66.01: ICD-10-CM

## 2017-05-05 DIAGNOSIS — J44.9: ICD-10-CM

## 2017-05-05 DIAGNOSIS — I10: ICD-10-CM

## 2017-05-05 DIAGNOSIS — Z90.49: ICD-10-CM

## 2017-05-05 DIAGNOSIS — E03.9: ICD-10-CM

## 2017-05-05 DIAGNOSIS — Z86.14: ICD-10-CM

## 2017-05-05 DIAGNOSIS — F17.210: ICD-10-CM

## 2017-05-05 DIAGNOSIS — L02.811: Primary | ICD-10-CM

## 2017-05-05 DIAGNOSIS — E11.9: ICD-10-CM

## 2017-05-05 PROCEDURE — 99283 EMERGENCY DEPT VISIT LOW MDM: CPT

## 2017-05-05 PROCEDURE — 0H90XZZ DRAINAGE OF SCALP SKIN, EXTERNAL APPROACH: ICD-10-PCS

## 2017-05-05 PROCEDURE — 10060 I&D ABSCESS SIMPLE/SINGLE: CPT

## 2017-05-05 NOTE — ER DOCUMENT REPORT
ED General





- General


Mode of Arrival: Medic


Information source: Patient


TRAVEL OUTSIDE OF THE [place] IN LAST 30 DAYS: No





- HPI


Patient complains to provider of: Abscess


Onset: Other - 5/3/2017


Onset/Duration: Gradual, Worse


Similar symptoms previously: Yes - 2017


Recently seen / treated by doctor: Yes





<JAELYN LOPEZ - Last Filed: 17 06:22>





<ASA HOOD - Last Filed: 17 07:49>





- General


Chief Complaint: Boil


Stated Complaint: BUMB ON HEAD


Notes: 


Patient is a 46-year-old female, with history of smoking and MRSA, presenting 

to the emergency department with concerns of a "bump" on top of her head that 

she noticed on 2017.  Patient was recently seen here for multiple I&D is, 

and prescribed Bactrim.  Patient states that she did fill the Bactrim.  Patient 

states this feels like the other abscesses that she is having drained recently, 

and states that she has not been able to sleep.  Patient also has a history of 

diabetes, but has not taken her medication in years.


 (JAELYN LOPEZ)





- Related Data


Allergies/Adverse Reactions: 


 





buspirone HCl [From BuSpar] Allergy (Verified 17 17:06)


 


tramadol [Tramadol] Allergy (Verified 17 17:06)


 











Past Medical History





- General


Information source: Patient





- Social History


Smoking Status: Current Every Day Smoker


Family History: Reviewed & Not Pertinent


Patient has suicidal ideation: No


Patient has homicidal ideation: No





- Past Medical History


Cardiac Medical History: Reports: Hx Hypercholesterolemia, Hx Hypertension


Pulmonary Medical History: Reports: Hx Bronchitis, Hx COPD, Hx Sleep Apnea


Endocrine Medical History: Reports: Hx Diabetes Mellitus Type 2, Hx 

Hypothyroidism


Musculoskeltal Medical History: Reports Hx Arthritis - chronic neck, lower back

, and knee pain, Reports Hx Musculoskeletal Trauma


Skin Medical History: Reports Hx MRSA, Reports Hx Psoriasis


Psychiatric Medical History: Reports: Hx Anxiety, Hx Attention Deficit 

Hyperactivity Disorder, Hx Bipolar Disorder, Hx Depression


Infectious Medical History: Reports: Hx MRSA


Past Surgical History: Reports: Hx Abdominal Surgery - Laparoscopy, Hx  

Section, Hx Cholecystectomy, Hx Gynecologic Surgery - Ectopic pregnancy repair, 

Hx Oral Surgery, Hx Orthopedic Surgery - Carpal tunnel, Hx Tubal Ligation





- Immunizations


Immunizations up to date: Yes


Hx Diphtheria, Pertussis, Tetanus Vaccination: Yes





<JAELYN LOPEZ - Last Filed: 17 06:22>





Review of Systems





- Review of Systems


Constitutional: No symptoms reported


EENT: No symptoms reported


Cardiovascular: No symptoms reported


Respiratory: No symptoms reported


Gastrointestinal: No symptoms reported


Genitourinary: No symptoms reported


Female Genitourinary: No symptoms reported


Musculoskeletal: No symptoms reported


Skin: See HPI, Other - "Bump" on head


Hematologic/Lymphatic: No symptoms reported


Neurological/Psychological: No symptoms reported


-: Yes All other systems reviewed and negative





<JAELYN LOPEZ - Last Filed: 17 06:22>





Physical Exam





- Vital signs


Interpretation: Normal





- General


General appearance: Appears well, Alert





- HEENT


Head: Normocephalic, Atraumatic, Other - See skin exam


Eyes: Normal


Pupils: PERRL





- Respiratory


Respiratory status: No respiratory distress


Chest status: Nontender


Breath sounds: Normal


Chest palpation: Normal





- Cardiovascular


Rhythm: Regular





- Abdominal


Inspection: Morbidly Obese


Distension: No distension


Bowel sounds: Normal


Tenderness: Nontender


Organomegaly: No organomegaly





- Back


Back: Normal, Nontender





- Extremities


General upper extremity: Normal inspection, Nontender


General lower extremity: Normal inspection, Nontender





- Neurological


Neuro grossly intact: Yes


Cognition: Normal


Orientation: AAOx4


Shellie Coma Scale Eye Opening: Spontaneous


Shellie Coma Scale Verbal: Oriented


Shellie Coma Scale Motor: Obeys Commands


Beaver Coma Scale Total: 15


Speech: Normal





- Psychological


Associated symptoms: Normal affect, Normal mood





- Skin


Skin Temperature: Warm


Skin Moisture: Dry


Skin irregularity: Abscess - Right occipital top of head. Tender, erythematous, 

mild edema





<JAELYN LOPEZ - Last Filed: 17 06:22>





Procedures





- Incision and Drainage


  ** Head


Time completed: 07:45


Anesthetic type: 1% Lidocaine


mL's of anesthetic: 3


Blade size: 11


I&D procedure: Shurclens applied, Iodoform packing placed


Incision Method: Incision made by scalpel


Amount/type of drainage: 0.5ml pus





<ASA HOOD - Last Filed: 17 07:49>





Discharge





<JAELYN LOPEZ - Last Filed: 17 06:22>





<ASA HOOD - Last Filed: 17 07:49>





- Discharge


Clinical Impression: 


 Scalp abscess, Diabetic peripheral neuropathy





Condition: Stable


Disposition: HOME, SELF-CARE


Additional Instructions: 


Abscess





     You have an abscess (boil).  This a pus-forming infection, usually due to 

staph.  Some boils may be left to drain on their own, but most require lancing.


     From the time the tender lump first appears, it may be three or four days 

before the abscess is ready to vj.  Local heat and rest help at this stage 

of treatment.  An antibiotic may prevent spread of the infection.


     Once the abscess is opened, packing may be placed into it.  This is done 

so pus is not sealed inside by premature closure of the cavity. The packing 

will be removed at your follow-up visit or you may be advised to remove it 

yourself at home.  Sometimes this packing must be replaced a few times during 

healing.


     The wound will heal with surprisingly little scar.  Depending on the size 

and location of an abscess, healing can take one to four weeks.


     You may shower and wash the area around the incision site two or three 

times a day.


     Antibiotics may be prescribed, but are usually not necessary after an 

abscess has been drained.


     If you develop fever, chilling, worsening pain, or increasing swelling in 

the area, call the doctor or return immediately.








Take medications as prescribed.


Remove the gauze packing in 2 days.


[facility]





RETURN TO THE EMERGENCY ROOM IF ANY NEW OR WORSENING SYMPTOMS.








Prescriptions: 


Gabapentin [Neurontin 300 mg Capsule] 300 mg PO Q8 #60 capsule


Sulfamethoxazole/Trimethoprim [Bactrim Ds Tablet] 2 tab PO BID #40 tablet


Scribe Attestation: 





17 07:48


I personally performed the services described in the documentation, reviewed 

and edited the documentation which was dictated to the scribe in my presence, 

and it accurately records my words and actions. (ASA HOOD)





Scribe Documentation





- Scribe


Written by Tonya:: Jaelyn Lopez 2017 0610


acting as scribe for :: [employer]





<JAELYN LOPEZ - Last Filed: 17 06:22>

## 2017-05-13 ENCOUNTER — HOSPITAL ENCOUNTER (EMERGENCY)
Dept: HOSPITAL 62 - ER | Age: 47
Discharge: HOME | End: 2017-05-13
Payer: SELF-PAY

## 2017-05-13 VITALS — DIASTOLIC BLOOD PRESSURE: 82 MMHG | SYSTOLIC BLOOD PRESSURE: 149 MMHG

## 2017-05-13 DIAGNOSIS — F17.210: ICD-10-CM

## 2017-05-13 DIAGNOSIS — R11.2: ICD-10-CM

## 2017-05-13 DIAGNOSIS — L02.811: Primary | ICD-10-CM

## 2017-05-13 DIAGNOSIS — R06.02: ICD-10-CM

## 2017-05-13 PROCEDURE — S0119 ONDANSETRON 4 MG: HCPCS

## 2017-05-13 PROCEDURE — 99284 EMERGENCY DEPT VISIT MOD MDM: CPT

## 2017-05-13 NOTE — ER DOCUMENT REPORT
ED General





- General


Chief Complaint: Breathing Difficulty


Stated Complaint: NAUSEA,DIFFICULTY BREATHING


Time Seen by Provider: 17 18:55


Mode of Arrival: Ambulatory


Information source: Patient, Relative, [facility] Records


Notes: 


46-year-old female presents with complaints of nausea vomiting.  Patient notes 

she has had chronic nausea with past 2 weeks.  Patient denies any fevers 

chills.  Patient denies any diarrhea.  Patient completely denies any abdominal 

pain.  Patient requests nausea control and discharge.  Patient notes she has a 

history of COPD and intermittently will get short of breath.  Patient notes 

initially she complained of shortness of breath but now has resolved and she 

does not have any at this time.  Patient also notes that she had an abscess on 

her scalp that she wants rechecked


TRAVEL OUTSIDE OF THE [place] IN LAST 30 DAYS: No





- HPI


Onset: Other


Onset/Duration: Intermittent


Quality of pain: No pain


Severity: Mild


Pain Level: Denies


Associated symptoms: Nausea, Vomiting


Exacerbated by: Denies


Relieved by: Denies


Similar symptoms previously: Yes


Recently seen / treated by doctor: Yes





- Related Data


Allergies/Adverse Reactions: 


 





buspirone HCl [From BuSpar] Allergy (Verified 17 17:49)


 


tramadol [Tramadol] Allergy (Verified 17 17:49)


 











Past Medical History





- Social History


Smoking Status: Current Every Day Smoker


Cigarette use (# per day): Yes


Chew tobacco use (# tins/day): No


Smoking Education Provided: No


Frequency of alcohol use: Occasional


Drug Abuse: None


Family History: Reviewed & Not Pertinent


Patient has suicidal ideation: No


Patient has homicidal ideation: No





- Past Medical History


Cardiac Medical History: Reports: Hx Hypercholesterolemia, Hx Hypertension


   Denies: Hx Pulmonary Embolism


Pulmonary Medical History: Reports: Hx Bronchitis, Hx COPD, Hx Sleep Apnea


   Denies: Hx Tuberculosis


Endocrine Medical History: Reports: Hx Diabetes Mellitus Type 2, Hx 

Hypothyroidism


Renal/ Medical History: Denies: Hx Peritoneal Dialysis


Musculoskeltal Medical History: Reports Hx Arthritis - chronic neck, lower back

, and knee pain, Reports Hx Musculoskeletal Trauma


Skin Medical History: Reports Hx MRSA, Reports Hx Psoriasis


Psychiatric Medical History: Reports: Hx Anxiety, Hx Attention Deficit 

Hyperactivity Disorder, Hx Bipolar Disorder, Hx Depression


Infectious Medical History: Reports: Hx MRSA


Past Surgical History: Reports: Hx Abdominal Surgery - Laparoscopy, Hx  

Section, Hx Cholecystectomy, Hx Gynecologic Surgery - Ectopic pregnancy repair, 

Hx Oral Surgery, Hx Orthopedic Surgery - Carpal tunnel, Hx Tubal Ligation





- Immunizations


Immunizations up to date: Yes


Hx Diphtheria, Pertussis, Tetanus Vaccination: Yes





Review of Systems





- Review of Systems


Notes: 


PHYSICAL EXAMINATION:





GENERAL: Well-appearing, well-nourished and in no acute distress.





HEAD: Scalp healing scab right occipital/parietal region small amount of pus 

expressed





EYES: Pupils equal round and reactive to light, extraocular movements intact, 

conjunctiva are normal.





ENT: Nares patent, oropharynx clear without exudates.  Moist mucous membranes.





NECK: Normal range of motion, supple without lymphadenopathy





LUNGS: Breath sounds clear to auscultation bilaterally and equal.  No wheezes 

rales or rhonchi.





HEART: Regular rate and rhythm without murmurs





ABDOMEN: Soft, nontender, nondistended abdomen.  No guarding, no rebound.  No 

masses appreciated.





Female : deferred





Musculoskeletal: Normal range of motion, no pitting or edema.  No cyanosis.





NEUROLOGICAL: Cranial nerves grossly intact.  Normal speech, normal gait.  

Normal sensory, motor exams





PSYCH: Normal mood, normal affect.





SKIN: Warm, Dry, normal turgor, no rashes or lesions noted.





Physical Exam





- Vital signs


Vitals: 


 











Temp Pulse Resp BP Pulse Ox


 


 97.9 F   88   26 H  149/82 H  98 


 


 17 17:51  17 17:51  17 17:51  17 17:51  17 17:51














Course





- Re-evaluation


Re-evalutation: 





17 19:29


Physical examination noted no significant abnormality, patient was given nausea 

control and she is very happy with this plan.  Patient wishes to be discharged.

  Patient does have a abscess of her right scalp, this was expressed and small 

amount of pus was drained.  Patient is already on antibiotics and has been 

given follow-up precautions verbally








After performing a Medical Screening Examination, I estimate there is LOW risk 

for ACUTE APPENDICITIS, BOWEL OBSTRUCTION, ACUTE CHOLECYSTITIS, PERFORATED 

DIVERTICULITIS, INCARCERATED HERNIA, PANCREATITIS, PELVIC INFLAMMATORY DISEASE, 

PERFORATED ULCER, ECTOPIC PREGNANCY, or TUBO-OVARIAN ABSCESS, thus I consider 

the discharge disposition reasonable. Also, there is no evidence or peritonitis

, sepsis, or toxicity. I have reevaluated this patient multiple times and no 

significant life threatening changes are noted. The patient and I have 

discussed the diagnosis and risks, and we agree with discharging home with 

close follow-up with the understanding that symptoms and presentations can 

change. We also discussed returning to the Emergency Department immediately if 

new or worsening symptoms occur. We have discussed the symptoms which are most 

concerning (e.g., bloody stool, fever, changing or worsening pain, vomiting) 

that necessitate immediate return.





- Vital Signs


Vital signs: 


 











Temp Pulse Resp BP Pulse Ox


 


 97.9 F   88   26 H  149/82 H  98 


 


 17 17:51  17 17:51  17 17:51  17 17:51  17 17:51














Discharge





- Discharge


Clinical Impression: 


 Admission for wound check of abscess





Nausea & vomiting


Qualifiers:


 Vomiting type: unspecified Vomiting Intractability: non-intractable Qualified 

Code(s): R11.2 - Nausea with vomiting, unspecified





Condition: Stable


Disposition: HOME, SELF-CARE


Instructions:  Vomiting ([facility])


Additional Instructions: 


Follow up with your physician tomorrow for further care or return to the ED 

IMMEDIATELY if symptoms worsen or new concerns occur. If you cannot afford to 

follow up with your primary care physician a list of low cost clinics have been 

provided at the end of your discharge papers as well.


Referrals: 


[facility] [Primary Care Provider] - Follow up as needed

## 2017-05-22 ENCOUNTER — HOSPITAL ENCOUNTER (EMERGENCY)
Dept: HOSPITAL 62 - ER | Age: 47
Discharge: HOME | End: 2017-05-22
Payer: SELF-PAY

## 2017-05-22 VITALS — DIASTOLIC BLOOD PRESSURE: 74 MMHG | SYSTOLIC BLOOD PRESSURE: 138 MMHG

## 2017-05-22 DIAGNOSIS — F17.210: ICD-10-CM

## 2017-05-22 DIAGNOSIS — M25.511: Primary | ICD-10-CM

## 2017-05-22 DIAGNOSIS — J44.9: ICD-10-CM

## 2017-05-22 DIAGNOSIS — Z86.14: ICD-10-CM

## 2017-05-22 DIAGNOSIS — Z88.8: ICD-10-CM

## 2017-05-22 DIAGNOSIS — Z88.5: ICD-10-CM

## 2017-05-22 DIAGNOSIS — Z71.6: ICD-10-CM

## 2017-05-22 DIAGNOSIS — Z59.9: ICD-10-CM

## 2017-05-22 DIAGNOSIS — I10: ICD-10-CM

## 2017-05-22 DIAGNOSIS — E11.9: ICD-10-CM

## 2017-05-22 PROCEDURE — 99283 EMERGENCY DEPT VISIT LOW MDM: CPT

## 2017-05-22 SDOH — ECONOMIC STABILITY - INCOME SECURITY: PROBLEM RELATED TO HOUSING AND ECONOMIC CIRCUMSTANCES, UNSPECIFIED: Z59.9

## 2017-05-22 NOTE — ER DOCUMENT REPORT
ED Extremity Problem, Upper





- General


Chief Complaint: Shoulder Injury


Stated Complaint: SHOULDER INJURY


Time Seen by Provider: 17 15:57


Mode of Arrival: Ambulatory


Information source: Patient


Notes: 


46-year-old female presenting to ED for right shoulder pain for the last 2 

months.  She has been seen in the emergency room 2 times in January, 3 times in 

February, 11 times in march, 2 times in April, and 3 times in May so far this 

year.  Patient has been instructed that she needs to find a primary doctor and 

follow-up with her primary doctor for her medical problems.  Patient states she 

did not have the money difficult her primary doctor.  Patient smokes a pack of 

cigarettes a day.


TRAVEL OUTSIDE OF THE [place] IN LAST 30 DAYS: No





- HPI


Patient complains to provider of: Right, Shoulder


Onset: Other - 2 Month


Recent injury: Possibly


Where: Home


Quality of pain: Sharp, Throbbing


Severity of pain: Severe


Pain Level: 5


Associated symptoms: None


Exacerbated by: Movement


Relieved by: Rest, Positioning


Similar symptoms previously: Yes


Recently seen / treated by doctor: Yes





- Related Data


Allergies/Adverse Reactions: 


 





buspirone HCl [From BuSpar] Allergy (Verified 17 14:51)


 


tramadol [Tramadol] Allergy (Verified 17 14:51)


 











Past Medical History





- General


Information source: Patient





- Social History


Smoking Status: Current Every Day Smoker


Cigarette use (# per day): Yes - Pack per day


Chew tobacco use (# tins/day): No


Smoking Education Provided: Yes - 2 minutes


Frequency of alcohol use: None


Drug Abuse: None


Lives with: Family


Family History: Reviewed & Not Pertinent


Patient has suicidal ideation: No


Patient has homicidal ideation: No





- Past Medical History


Cardiac Medical History: Reports: Hx Hypercholesterolemia, Hx Hypertension


Pulmonary Medical History: Reports: Hx Bronchitis, Hx COPD, Hx Sleep Apnea


EENT Medical History: Reports: None


Neurological Medical History: Reports: None


Endocrine Medical History: Reports: Hx Diabetes Mellitus Type 2, Hx 

Hypothyroidism


Renal/ Medical History: Reports: None


Malignancy Medical History: Reports: None


GI Medical History: Reports: None


Musculoskeltal Medical History: Reports Hx Arthritis - chronic neck, lower back

, and knee pain, Reports Hx Musculoskeletal Trauma


Skin Medical History: Reports Hx MRSA, Reports Hx Psoriasis


Psychiatric Medical History: Reports: Hx Anxiety, Hx Attention Deficit 

Hyperactivity Disorder, Hx Bipolar Disorder, Hx Depression


Traumatic Medical History: Reports: None


Infectious Medical History: Reports: Hx MRSA


Past Surgical History: Reports: Hx Abdominal Surgery - Laparoscopy, Hx  

Section, Hx Cholecystectomy, Hx Gynecologic Surgery - Ectopic pregnancy repair, 

Hx Oral Surgery, Hx Orthopedic Surgery - Carpal tunnel, Hx Tubal Ligation





- Immunizations


Immunizations up to date: Yes


Hx Diphtheria, Pertussis, Tetanus Vaccination: Yes





Review of Systems





- Review of Systems


Constitutional: No symptoms reported


EENT: No symptoms reported


Cardiovascular: No symptoms reported


Respiratory: No symptoms reported


Gastrointestinal: No symptoms reported


Genitourinary: No symptoms reported


Female Genitourinary: No symptoms reported


Musculoskeletal: Other - Shoulder pain


Skin: No symptoms reported


Hematologic/Lymphatic: No symptoms reported


Neurological/Psychological: No symptoms reported


-: Yes All other systems reviewed and negative





Physical Exam





- Vital signs


Vitals: 


 











Temp Pulse Resp BP Pulse Ox


 


 98.2 F   95   18   146/86 H  97 


 


 17 14:51  17 14:51  17 14:51  17 14:51  17 14:51











Interpretation: Normal





- General


General appearance: Appears well, Alert





- HEENT


Head: Normocephalic, Atraumatic


Eyes: Normal


Pupils: PERRL





- Respiratory


Respiratory status: No respiratory distress


Chest status: Nontender


Breath sounds: Normal


Chest palpation: Normal





- Cardiovascular


Rhythm: Regular


Heart sounds: Normal auscultation


Murmur: No





- Abdominal


Inspection: Normal


Distension: No distension


Bowel sounds: Normal


Tenderness: Nontender


Organomegaly: No organomegaly





- Back


Back: Normal, Nontender





- Extremities


General upper extremity: Normal color, Normal temperature


General lower extremity: Normal inspection, Nontender, Normal color, Normal ROM

, Normal temperature, Normal weight bearing.  No: Deana's sign


Shoulder: Tender, Limited ROM - patient refuses to move arm.  No: Abrasion, 

Deformity, Dislocation, Ecchymosis, Instability, Laceration





- Neurological


Neuro grossly intact: Yes


Cognition: Normal


Orientation: AAOx4


Shellie Coma Scale Eye Opening: Spontaneous


Shellie Coma Scale Verbal: Oriented


Shellie Coma Scale Motor: Obeys Commands


[place] Coma Scale Total: 15


Speech: Normal


Motor strength normal: LUE, RUE, LLE, RLE


Sensory: Normal





- Psychological


Associated symptoms: Normal affect, Normal mood





- Skin


Skin Temperature: Warm


Skin Moisture: Dry


Skin Color: Normal





Course





- Re-evaluation


Re-evalutation: 





17 17:25


Discharge planning has been by to see the patient, patient has been informed 

she needs to follow-up with her primary doctor and orthopedics.





- Vital Signs


Vital signs: 


 











Temp Pulse Resp BP Pulse Ox


 


 98.7 F   85   16   138/74 H  98 


 


 17 16:48  17 16:48  17 16:48  17 16:48  17 16:48














Discharge





- Discharge


Clinical Impression: 


Pain in right shoulder


Qualifiers:


 Chronicity: chronic Qualified Code(s): M25.511 - Pain in right shoulder





Condition: Stable


Disposition: HOME, SELF-CARE


Instructions:  Exercise Program for the Shoulder ([facility])


Additional Instructions: 


Shoulder Injury





     You have injured your shoulder 2 months ago.  This usually results from 

stretching or tearing of the tendons during trauma.  Time and protection are 

required in order to heal properly.  Many injuries are quite disabling, and 

should be taken seriously.


     Initial treatment includes cold packs and a sling to rest the shoulder.  

The physician has assessed the seriousness of your injury, and has outlined a 

treatment plan.  Understand that this treatment may change, depending on how 

you progress.


     If a re-examination was recommended, it is important that you follow up as 

instructed.  Some shoulder injuries (such as partial tear of the rotator cuff) 

are only suspected after you've failed to improve.


Call us if there's severe pain, numbness, or loss of function.





Acetaminophen





     Acetaminophen may be taken for pain relief or fever control. It's much 

safer than aspirin, offering a wider range of "safe" dosages.  It is safe 

during pregnancy.  Some brand names are Tylenol, Panadol, Datril, Anacin 3, 

Tempra, and Liquiprin. Acetaminophen can be repeated every four hours.  The 

following are maximum recommended dosages:





WEIGHT         Dose             Drops                  Elixir        Chewable(

80mg)


(LBS.)                            drprs=droppers    tsp=teaspoon


6                 40 mg            .4 ml (1/2)


6-11            80 mg            .8 ml (full)            1/2   tsp           1 

      tab


12-16         120 mg           1 1/2 drprs            3/4   tsp           1 1/2

  tabs


17-23         160 mg             2  drprs              1      tsp           2  

     tabs


24-30         240 mg             3  drprs              1 1/2 tsp           3   

    tabs


30-35         320 mg                                     2       tsp           

4       tabs


36-41         360 mg                                     2 1/4 tsp           4 1

/2  tabs


42-47         400 mg                                     2 1/2 tsp           5 

      tabs


48-53         480 mg                                     3       tsp          6

       tabs


54-59         520 mg                                     3  1/4 tsp          6 1

/2 tabs


60-64         560 mg                                     3  1/2 tsp          7 

     tabs 


65-70         600 mg                                     3  3/4 tsp          7 1

/2 tabs


71-76         640 mg                                     4       tsp           

8      tabs


77-82         720 mg                                     4 1/2  tsp           9

      tabs


83-88         800 mg                                     5       tsp           

10     tabs





>89 pounds or adults          650 mg to 900 mg 





Acetaminophen can be repeated every four hours. Maximum daily dose not to 

exceed 4000 mg.





   These maximum recommended dosages are slightly higher than the dosages 

written on the product container, but these dosages are very safe and well 

below the toxic dosage for acetaminophen.





Ibuprofen





     Ibuprofen is an excellent, safe drug for pain control.  In addition, it 

has potent antiinflammatory effects which are beneficial, especially in the 

treatment of injuries, arthritis, or tendonitis. It's best to take ibuprofen 

with food.  Persons with ulcer disease or allergy to aspirin should notify 

their physician of this before taking ibuprofen.


     Take the medication exactly as prescribed.  Don't take additional doses 

unless instructed to do so by your doctor.  If you develop wheezing, shortness 

of breath, hives, faintness, stomach pain, vomiting, or dark black stools, 

return for re-evaluation at once.





Oral Narcotic Medication





     You have been given a prescription for pain control.  This medication is a 

narcotic.  It's best taken with food, as nausea can result if taken on an empty 

stomach.


     Don't operate machinery or drive within six hours of taking this 

medication.  Do not combine this medicine with alcohol, or with any medication 

which can cause sedation (such as cold tablets or sleeping pills) unless you 

get permission from the physician.


     Narcotics tend to cause constipation.  If possible, drink plenty of fluids 

and eat a diet high in fiber and fruits.





FOLLOW-UP CARE:


If you have been referred to a physician for follow-up care, call the physician

s office for an appointment as you were instructed or within the next two days.

  If you experience worsening or a significant change in your symptoms, notify 

the physician immediately or return to the Emergency Department at any time for 

re-evaluation.


Prescriptions: 


Hydrocodone/Acetaminophen [Norco 5-325 mg Tablet] 1 tab PO BIDP PRN #6 tablet


 PRN Reason: 


Forms:  Elevated Blood Pressure, Smoking Cessation Education


Referrals: 


MONTSERRAT ZAMORA DO [ACTIVE STAFF] - Follow up as needed

## 2017-06-03 ENCOUNTER — HOSPITAL ENCOUNTER (EMERGENCY)
Dept: HOSPITAL 62 - ER | Age: 47
LOS: 1 days | Discharge: HOME | End: 2017-06-04
Payer: SELF-PAY

## 2017-06-03 DIAGNOSIS — Z86.14: ICD-10-CM

## 2017-06-03 DIAGNOSIS — E11.9: ICD-10-CM

## 2017-06-03 DIAGNOSIS — L70.0: ICD-10-CM

## 2017-06-03 DIAGNOSIS — J44.9: ICD-10-CM

## 2017-06-03 DIAGNOSIS — I10: ICD-10-CM

## 2017-06-03 DIAGNOSIS — F17.200: ICD-10-CM

## 2017-06-03 DIAGNOSIS — L02.211: Primary | ICD-10-CM

## 2017-06-03 PROCEDURE — 99283 EMERGENCY DEPT VISIT LOW MDM: CPT

## 2017-06-03 PROCEDURE — 0H97XZZ DRAINAGE OF ABDOMEN SKIN, EXTERNAL APPROACH: ICD-10-PCS | Performed by: NURSE PRACTITIONER

## 2017-06-03 PROCEDURE — 10060 I&D ABSCESS SIMPLE/SINGLE: CPT

## 2017-06-04 VITALS — SYSTOLIC BLOOD PRESSURE: 148 MMHG | DIASTOLIC BLOOD PRESSURE: 88 MMHG

## 2017-06-15 ENCOUNTER — HOSPITAL ENCOUNTER (EMERGENCY)
Dept: HOSPITAL 62 - ER | Age: 47
Discharge: HOME | End: 2017-06-15
Payer: SELF-PAY

## 2017-06-15 VITALS — DIASTOLIC BLOOD PRESSURE: 88 MMHG | SYSTOLIC BLOOD PRESSURE: 157 MMHG

## 2017-06-15 DIAGNOSIS — L02.91: ICD-10-CM

## 2017-06-15 DIAGNOSIS — J44.9: ICD-10-CM

## 2017-06-15 DIAGNOSIS — Z86.14: ICD-10-CM

## 2017-06-15 DIAGNOSIS — I10: ICD-10-CM

## 2017-06-15 DIAGNOSIS — F17.200: ICD-10-CM

## 2017-06-15 DIAGNOSIS — B37.2: Primary | ICD-10-CM

## 2017-06-15 DIAGNOSIS — E11.9: ICD-10-CM

## 2017-06-15 PROCEDURE — 99282 EMERGENCY DEPT VISIT SF MDM: CPT

## 2017-06-15 NOTE — ER DOCUMENT REPORT
HPI





- HPI


Patient complains to provider of: Abscess


Onset: Yesterday


Onset/Duration: Gradual


Quality of pain: Achy


Pain Level: 5


Context: 


Patient states she has a history of frequent abscesses and suspects that she 

has 1 today.  Patient reports tenderness to left groin area.  Patient does have 

a history of diabetes but states that her blood sugars have been running in the 

130s at home.


Associated Symptoms: Other - Tender lump left groin


Exacerbated by: Denies


Relieved by: Denies


Similar symptoms previously: Yes


Recently seen / treated by doctor: No





- ROS


ROS below otherwise negative: Yes


Systems Reviewed and Negative: Yes All other systems reviewed and negative





- CONSTITUTIONAL


Constitutional: DENIES: Fever, Chills





- GASTROINTESTINAL


Gastrointestinal: DENIES: Abdominal Pain, Nausea, Patient vomiting





- REPRODUCTIVE


Reproductive: DENIES: Pregnant:





- DERM


Skin Color: Normal


Skin Problems: Rash


Notes: 


Tender lump to left groin area concerning for abscess





Past Medical History





- General


Information source: Patient





- Social History


Smoking Status: Current Every Day Smoker


Frequency of alcohol use: None


Drug Abuse: None


Lives with: Spouse/Significant other


Family History: Reviewed & Not Pertinent


Patient has suicidal ideation: No


Patient has homicidal ideation: No





- Past Medical History


Cardiac Medical History: Reports: Hx Hypercholesterolemia, Hx Hypertension


   Denies: Hx Pulmonary Embolism


Pulmonary Medical History: Reports: Hx Bronchitis, Hx COPD - no home o2, Hx 

Sleep Apnea


   Denies: Hx Tuberculosis


Endocrine Medical History: Reports: Hx Diabetes Mellitus Type 2, Hx 

Hypothyroidism


Renal/ Medical History: Denies: Hx Peritoneal Dialysis


Musculoskeltal Medical History: Reports Hx Arthritis - chronic neck, lower back

, and knee pain, Reports Hx Musculoskeletal Trauma


Skin Medical History: Reports Hx MRSA, Reports Hx Psoriasis


Psychiatric Medical History: Reports: Hx Anxiety, Hx Attention Deficit 

Hyperactivity Disorder, Hx Bipolar Disorder, Hx Depression


Infectious Medical History: Reports: Hx MRSA


Past Surgical History: Reports: Hx Abdominal Surgery - Laparoscopy, Hx  

Section - x3, Hx Cholecystectomy, Hx Gynecologic Surgery - Ectopic pregnancy 

repair, Hx Oral Surgery, Hx Orthopedic Surgery - Carpal tunnel bilat, Hx Tubal 

Ligation





- Immunizations


Immunizations up to date: Yes


Hx Diphtheria, Pertussis, Tetanus Vaccination: Yes





Vertical Provider Document





- CONSTITUTIONAL


Agree With Documented VS: Yes


Exam Limitations: No Limitations


General Appearance: WD/WN, No Apparent Distress





- INFECTION CONTROL


TRAVEL OUTSIDE OF THE [place] IN LAST 30 DAYS: No





- HEENT


HEENT: Atraumatic, Normocephalic





- NECK


Neck: Normal Inspection, Supple





- RESPIRATORY


Respiratory: Breath Sounds Normal, No Respiratory Distress, Chest Non-Tender





- CARDIOVASCULAR


Cardiovascular: Regular Rate, Regular Rhythm, No Murmur





- GI/ABDOMEN


Gastrointestinal: Abdomen Soft


Notes: 


morbidly obese





- BACK


Back: Normal Inspection





- MUSCULOSKELETAL/EXTREMETIES


Musculoskeletal/Extremeties: MAEW





- NEURO


Level of Consciousness: Awake, Alert, Appropriate


Motor/Sensory: No Motor Deficit





- DERM


Integumentary: Warm, Dry, Rash - scaling rash with well defined borders to 

inguinal fold, axilla and under breasts.  negative: Abscess


Notes: 


tender indurated skin lesion, to left inguinal area, no fluctuance





Course





- Re-evaluation


Re-evalutation: 


06/15/17 20:02


Patient with tender indurated nodular lesion to left inguinal area, suspect 

that this is inflamed lymph node that will cover for developing abscess given 

patient's history of MRSA in the past.





06/15/17 


The patient has been informed that they may have pre-hypertension or 

hypertension based on a blood pressure reading in the emergency department.  I 

recommend that patient call the primary care provider listed on their discharge 

instructions or a physician of their choice by this week to arrange follow-up 

for further evaluation of possible pre-hypertension her hypertension.





Discharge





- Discharge


Clinical Impression: 


 Candidal intertrigo, early abscess, Elevated blood pressure reading





Condition: Stable


Disposition: HOME, SELF-CARE


Instructions:  Trimethoprim-Sulfa ([facility]), Abscess ([facility]), Skin Fungus ([facility])


Additional Instructions: 


Your blood pressure was mildly elevated today, recheck with your primary doctor 

this week for recheck.  





Return immediately for any new or worsening symptoms





Followup with your primary care provider, call tomorrow to make a followup 

appointment








Prescriptions: 


Ketoconazole [Nizoral] 1 applic TP DAILY #60 cream.gm.


Sulfamethoxazole/Trimethoprim [Bactrim Ds Tablet] 1 each PO BID #20 tablet


Forms:  Elevated Blood Pressure


Referrals: 


[facility] [Primary Care Provider] - Follow up in 3-5 days

## 2017-06-22 ENCOUNTER — HOSPITAL ENCOUNTER (EMERGENCY)
Dept: HOSPITAL 62 - ER | Age: 47
Discharge: HOME | End: 2017-06-22
Payer: SELF-PAY

## 2017-06-22 VITALS — DIASTOLIC BLOOD PRESSURE: 78 MMHG | SYSTOLIC BLOOD PRESSURE: 108 MMHG

## 2017-06-22 DIAGNOSIS — Z88.8: ICD-10-CM

## 2017-06-22 DIAGNOSIS — Z88.5: ICD-10-CM

## 2017-06-22 DIAGNOSIS — J44.9: ICD-10-CM

## 2017-06-22 DIAGNOSIS — F17.200: ICD-10-CM

## 2017-06-22 DIAGNOSIS — G89.29: ICD-10-CM

## 2017-06-22 DIAGNOSIS — E11.9: ICD-10-CM

## 2017-06-22 DIAGNOSIS — I10: ICD-10-CM

## 2017-06-22 DIAGNOSIS — M25.511: Primary | ICD-10-CM

## 2017-06-22 PROCEDURE — 99283 EMERGENCY DEPT VISIT LOW MDM: CPT
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
## 2017-06-22 NOTE — RADIOLOGY REPORT ([facility])
EXAM DESCRIPTION:  SHOULDER RIGHT 2 OR MORE VIEWS



COMPLETED DATE/TIME:  6/22/2017 4:54 am



REASON FOR STUDY:  ? dislocation



COMPARISON:  2.2.17



NUMBER OF VIEWS:  Three views.



TECHNIQUE:  Internal rotation, external rotation, and Y view images acquired of the right shoulder.



LIMITATIONS:  None.



FINDINGS:  MINERALIZATION: Normal.

BONES: No acute fracture or dislocation.  No worrisome bone lesions.

JOINTS: No dislocation.

VISUALIZED LUNGS AND RIBS: No pneumothorax.  No rib fracture.

SOFT TISSUES: No radiopaque foreign body.

OTHER: No other significant finding.



IMPRESSION:  NEGATIVE STUDY OF THE RIGHT SHOULDER. NO RADIOGRAPHIC EVIDENCE OF ACUTE INJURY.



TECHNICAL DOCUMENTATION:  JOB ID:  [id number]

 2011 [employer]- All Rights Reserved

## 2017-07-05 NOTE — ER DOCUMENT REPORT
ED Medical Screen (RME)





- General


Stated Complaint: EAR PAIN


Mode of Arrival: Medic


Information source: Patient


Notes: 


Patient presents with complaint of bilateral ear pain that started yesterday.  

Patient reports chills at home.





I have greeted and performed a rapid initial assessment of this patient.  A 

comprehensive ED assessment and evaluation of the patient, analysis of test 

results and completion of the medical decision making process will be conducted 

by additional ED providers.


TRAVEL OUTSIDE OF THE [place] IN LAST 30 DAYS: No





- Related Data


Allergies/Adverse Reactions: 


 





buspirone HCl [From BuSpar] Allergy (Verified 17 19:41)


 


tramadol [Tramadol] Allergy (Verified 17 19:41)


 











Past Medical History





- Past Medical History


Cardiac Medical History: Reports: Hx Hypercholesterolemia, Hx Hypertension


   Denies: Hx Pulmonary Embolism


Pulmonary Medical History: Reports: Hx Bronchitis, Hx COPD


   Denies: Hx Tuberculosis


Endocrine Medical History: Reports: Hx Diabetes Mellitus Type 2, Hx 

Hypothyroidism


Renal/ Medical History: Denies: Hx Peritoneal Dialysis


Musculoskeltal Medical History: Reports Hx Arthritis - chronic neck, lower back

, and knee pain, Reports Hx Musculoskeletal Trauma


Skin Medical History: Reports Hx MRSA, Reports Hx Psoriasis


Psychiatric Medical History: Reports: Hx Anxiety, Hx Attention Deficit 

Hyperactivity Disorder, Hx Bipolar Disorder, Hx Depression


Infectious Medical History: Reports: Hx MRSA


Past Surgical History: Reports: Hx Abdominal Surgery - LAPRASCOPY, Hx  

Section, Hx Cholecystectomy, Hx Gynecologic Surgery - ectopic pregnancy repair, 

Hx Oral Surgery, Hx Orthopedic Surgery - carpal tunnel, Hx Tubal Ligation





- Immunizations


Immunizations up to date: Yes


Hx Diphtheria, Pertussis, Tetanus Vaccination: Yes





Physical Exam





- Vital signs


Vitals: 


 











Temp Pulse Resp BP Pulse Ox


 


 98.4 F   81   18   136/84 H  100 


 


 17 18:28  17 18:28  17 18:28  17 18:28  17 18:28














- General


General appearance: Appears well, Alert


In distress: None





Course





- Vital Signs


Vital signs: 


 











Temp Pulse Resp BP Pulse Ox


 


 98.4 F   81   18   136/84 H  100 


 


 17 18:28  17 18:28  17 18:28  17 18:28  17 18:28 pressure

## 2017-07-27 ENCOUNTER — HOSPITAL ENCOUNTER (EMERGENCY)
Dept: HOSPITAL 62 - ER | Age: 47
Discharge: HOME | End: 2017-07-27
Payer: SELF-PAY

## 2017-07-27 VITALS — SYSTOLIC BLOOD PRESSURE: 136 MMHG | DIASTOLIC BLOOD PRESSURE: 85 MMHG

## 2017-07-27 DIAGNOSIS — F17.200: ICD-10-CM

## 2017-07-27 DIAGNOSIS — Z86.14: ICD-10-CM

## 2017-07-27 DIAGNOSIS — H57.12: ICD-10-CM

## 2017-07-27 DIAGNOSIS — L03.211: Primary | ICD-10-CM

## 2017-07-27 PROCEDURE — 99283 EMERGENCY DEPT VISIT LOW MDM: CPT

## 2017-07-27 NOTE — ER DOCUMENT REPORT
ED Eye Complaint





- General


Chief Complaint: Eye Problem


Stated Complaint: EYE PAIN


Time Seen by Provider: 17 15:46


Notes: 


Patient is a 46-year-old female presents emergency department complaining of 

left eyebrow swelling, pain.  Patient states that she had a pimple in her left 

eyebrow which she tried to pop on Tuesday with minimal clear drainage.  She 

states over the past 2 days it is increased in tenderness, swelling and 

redness.  She denies picking at it.  She does have a history of MRSA.  

Otherwise she denies any vision changes.  Admits to intermittent headaches but 

otherwise denies any vision changes, aura, light sensitivity, sound 

sensitivity.  She states that her pain has been well treated with Motrin.


TRAVEL OUTSIDE OF THE [place] IN LAST 30 DAYS: No





- Related Data


Allergies/Adverse Reactions: 


 





buspirone HCl [From BuSpar] Allergy (Verified 17 15:18)


 


ondansetron [From Zofran (as hydrochloride)] Allergy (Verified 17 15:18)


 


tramadol [Tramadol] Allergy (Verified 17 15:18)


 











Past Medical History





- Social History


Smoking Status: Current Every Day Smoker


Family History: Reviewed & Not Pertinent


Patient has suicidal ideation: No


Patient has homicidal ideation: No





- Past Medical History


Cardiac Medical History: Reports: Hx Hypercholesterolemia, Hx Hypertension


   Denies: Hx Pulmonary Embolism


Pulmonary Medical History: Reports: Hx Bronchitis, Hx COPD - no home o2, Hx 

Sleep Apnea


   Denies: Hx Tuberculosis


Endocrine Medical History: Reports: Hx Diabetes Mellitus Type 2, Hx 

Hypothyroidism


Renal/ Medical History: Denies: Hx Peritoneal Dialysis


Musculoskeltal Medical History: Reports Hx Arthritis - chronic neck, lower back

, and knee pain, Reports Hx Musculoskeletal Trauma


Skin Medical History: Reports Hx MRSA, Reports Hx Psoriasis


Psychiatric Medical History: Reports: Hx Anxiety, Hx Attention Deficit 

Hyperactivity Disorder, Hx Bipolar Disorder, Hx Depression


Infectious Medical History: Reports: Hx MRSA


Past Surgical History: Reports: Hx Abdominal Surgery - Laparoscopy, Hx  

Section - x3, Hx Cholecystectomy, Hx Gynecologic Surgery - Ectopic pregnancy 

repair, Hx Oral Surgery, Hx Orthopedic Surgery - Carpal tunnel bilat, Hx Tubal 

Ligation





- Immunizations


Immunizations up to date: Yes


Hx Diphtheria, Pertussis, Tetanus Vaccination: Yes





Review of Systems





- Review of Systems


Constitutional: No symptoms reported


Skin: See HPI


Neurological/Psychological: No symptoms reported


-: Yes All other systems reviewed and negative





Physical Exam





- Vital signs


Vitals: 


 











Temp Pulse Resp BP Pulse Ox


 


 98.4 F   91   20   136/85 H  98 


 


 17 15:19  17 15:19  17 15:19  17 15:19  17 15:19














- General


General appearance: Appears well, Alert


In distress: None





- HEENT


Head: Normocephalic, Atraumatic


Eyes: Normal


Conjunctiva: Normal


Cornea: Normal


Extraocular movements intact: Yes


Eyelashes: Normal


Pupils: PERRL





- Skin


Notes: 


Patient with evidence of folliculitis of the left eyebrow with scab.  Minimal 

surrounding erythema and minimal induration.  No evidence of periorbital 

cellulitis.  





Course





- Re-evaluation


Re-evalutation: 





17 17:29


Patient is a 46-year-old female hemodynamic stable, no acute distress afebrile.

  Presentation today is consistent with folliculitis.  Will cover for MRSA 

given patient's history.  Patient given strict return precautions and return to 

the emergency department as necessary.  Otherwise a follow-up with primary 

care.  Patient agrees with plan.  Stable for discharge home





- Vital Signs


Vital signs: 


 











Temp Pulse Resp BP Pulse Ox


 


 98.4 F   91   20   136/85 H  98 


 


 17 15:19  17 15:19  17 15:19  17 15:19  17 15:19














Discharge





- Discharge


Clinical Impression: 


Cellulitis


Qualifiers:


 Site of cellulitis: face Qualified Code(s): L03.211 - Cellulitis of face





Condition: Good


Disposition: HOME, SELF-CARE


Instructions:  MRSA Cellulitis ([facility]), Warm Packs ([facility]), Use of Over-The-Counter 

Ibuprofen ([facility])


Prescriptions: 


Cephalexin Monohydrate [Keflex 500 mg Capsule] 500 mg PO QID #20 capsule


Sulfamethoxazole/Trimethoprim [Bactrim Ds Tablet] 1 each PO BID #10 tablet


Forms:  Elevated Blood Pressure


Referrals: 


[facility] [Primary Care Provider] - Follow up as needed

## 2017-08-31 ENCOUNTER — HOSPITAL ENCOUNTER (EMERGENCY)
Dept: HOSPITAL 62 - ER | Age: 47
Discharge: HOME | End: 2017-08-31
Payer: COMMERCIAL

## 2017-08-31 VITALS — SYSTOLIC BLOOD PRESSURE: 146 MMHG | DIASTOLIC BLOOD PRESSURE: 87 MMHG

## 2017-08-31 DIAGNOSIS — H92.03: ICD-10-CM

## 2017-08-31 DIAGNOSIS — H60.503: Primary | ICD-10-CM

## 2017-08-31 DIAGNOSIS — F17.200: ICD-10-CM

## 2017-08-31 PROCEDURE — 99282 EMERGENCY DEPT VISIT SF MDM: CPT

## 2017-08-31 NOTE — ER DOCUMENT REPORT
HPI





- HPI


Pain Level: 4


Notes: 





Patient is a 46-year-old female who presents the ED complaining of bilateral 

ear pain with the left being worse than the right 2 days.  Patient states that 

she has had decreased hearing in the left ear and it feels like it is swollen.  

Patient has been having trouble sleeping because of the pain.  She has been 

trying over-the-counter meds with minimal relief.  Patient has not noticed any 

obvious discharge.  She still eating and drinking without difficulties 

otherwise.  Patient states that she did have a recent upper respiratory 

infection.  Denies any headache, fever, head injury, neck pain, sore throat, 

chest pain, palpitations, syncope, cough, shortness of breath, wheeze, dyspnea, 

abdominal pain, nausea/vomiting/diarrhea, urinary retention, dysuria, hematuria

, or rash.





- ROS


Notes: 





REVIEW OF SYSTEMS:


CONSTITUTIONAL :  Denies fever,  chills, or sweats.  Denies recent illness.


EENT:   see hpi


CARDIOVASCULAR:  Denies chest pain.  Denies palpitations or racing or irregular 

heart beat.  Denies ankle edema.


RESPIRATORY:  Denies cough, cold, or chest congestion.  Denies shortness of 

breath, difficulty breathing, or wheezing.


GASTROINTESTINAL:  Denies abdominal pain or distention.  Denies nausea, vomiting

, or diarrhea.  Denies blood in vomitus, stools, or per rectum.  Denies black, 

tarry stools.  Denies constipation.  


GENITOURINARY:  Denies difficulty urinating, painful urination, burning, 

frequency, blood in urine, or discharge.


MUSCULOSKELETAL:  Denies back or neck pain or stiffness.  Denies joint pain or 

swelling.


SKIN:   Denies rash, lesions or sores.


NEUROLOGICAL:  Denies confusion or altered mental status.  Denies passing out 

or loss of consciousness.  Denies dizziness or lightheadedness.  Denies 

headache.  Denies weakness or paralysis or loss of use of either side.  Denies 

problems with gait or speech.  Denies sensory loss, numbness, or tingling.  





ALL OTHER SYSTEMS REVIEWED AND NEGATIVE.





Dictation was performed using Dragon voice recognition software





- CARDIOVASCULAR


Cardiovascular: DENIES: Chest pain





- REPRODUCTIVE


Reproductive: DENIES: Pregnant:





- DERM


Skin Color: Normal





Past Medical History





- Social History


Smoking Status: Current Every Day Smoker


Chew tobacco use (# tins/day): No


Frequency of alcohol use: None


Drug Abuse: None


Family History: Reviewed & Not Pertinent





- Past Medical History


Cardiac Medical History: Reports: Hx Hypercholesterolemia, Hx Hypertension


   Denies: Hx Pulmonary Embolism


Pulmonary Medical History: Reports: Hx Bronchitis, Hx COPD - no home o2, Hx 

Sleep Apnea


   Denies: Hx Tuberculosis


Endocrine Medical History: Reports: Hx Diabetes Mellitus Type 2, Hx 

Hypothyroidism


Renal/ Medical History: Denies: Hx Peritoneal Dialysis


Musculoskeltal Medical History: Reports Hx Arthritis - chronic neck, lower back

, and knee pain, Reports Hx Musculoskeletal Trauma


Skin Medical History: Reports Hx MRSA, Reports Hx Psoriasis


Psychiatric Medical History: Reports: Hx Anxiety, Hx Attention Deficit 

Hyperactivity Disorder, Hx Bipolar Disorder, Hx Depression


Infectious Medical History: Reports: Hx MRSA


Past Surgical History: Reports: Hx Abdominal Surgery - Laparoscopy, Hx  

Section - x3, Hx Cholecystectomy, Hx Gynecologic Surgery - Ectopic pregnancy 

repair, Hx Oral Surgery, Hx Orthopedic Surgery - Carpal tunnel bilat, Hx Tubal 

Ligation





- Immunizations


Immunizations up to date: Yes


Hx Diphtheria, Pertussis, Tetanus Vaccination: Yes





Vertical Provider Document





- CONSTITUTIONAL


Agree With Documented VS: Yes


Notes: 





PHYSICAL EXAMINATION:





GENERAL: Well-appearing, well-nourished and in no acute distress.





HEAD: Atraumatic, normocephalic.





EYES: Pupils equal round and reactive to light, extraocular movements intact, 

sclera anicteric, conjunctiva are normal.





ENT: Mild erythema rt EAC, no swelling.  Rt TM wnl.  Lt TM swollen, inflamed 

and tender.  TM lt unable to visualize.  Nares patent and without discharge.  

oropharynx clear without exudates.  No tonsilar hypertrophy or erythema.  Moist 

mucous membranes.  No sinus tenderness.





NECK: Normal range of motion, supple without lymphadenopathy.   No rigidity/

meningismus.





LUNGS: Breath sounds clear to auscultation bilaterally and equal.  No wheezes 

rales or rhonchi.





HEART: Regular rate and rhythm without murmurs, rubs, gallops.





Extremities:  No cyanosis, clubbing, or edema b/l.  Peripheral pulses 2+.  

Capillary refill less than 3 seconds.





NEUROLOGICAL: Cranial nerves grossly intact.  Normal speech, normal gait.  

Normal sensory, motor exams 





PSYCH: Normal mood, normal affect.





SKIN: Warm, Dry, normal turgor, no rashes or lesions noted.





- INFECTION CONTROL


TRAVEL OUTSIDE OF THE [place] IN LAST 30 DAYS: No





- RESPIRATORY


O2 Sat by Pulse Oximetry: 98





Course





- Re-evaluation


Re-evalutation: 





17 16:35


Patient is an afebrile, well-hydrated, 46-year-old female who presented to the 

ED with bilateral otitis externa with the left being much worse than the right.

  Vitals are stable.  PE otherwise unremarkable.  Low suspicion/risk for any 

systemic emergent condition at this time.  Patient is aware that her condition 

can change and she needs to monitor symptoms closely and seek medical attention 

if any acute changes.  Wick was placed today and Ciprodex applied bilaterally.  

Patient to finish the Ciprodex at home twice a day for 7 days.  Tylenol 650 mg 

given p.o. today.  Conservative measures otherwise for symptoms.  Recheck with 

your PCM this week.  Consider consult with ENT.  Return to the ED with any 

worsening/concerning symptoms otherwise as reviewed in discharge.  Patient is 

in agreement.








- Vital Signs


Vital signs: 


 











Temp Pulse Resp BP Pulse Ox


 


 98.1 F   105 H  22 H  137/86 H  98 


 


 17 14:51  17 14:51  17 14:51  17 14:51  17 14:51














Discharge





- Discharge


Clinical Impression: 


Otitis externa


Qualifiers:


 Otitis externa type: unspecified type Chronicity: acute Laterality: bilateral 

Qualified Code(s): H60.503 - Unspecified acute noninfective otitis externa, 

bilateral





Condition: Stable


Disposition: HOME, SELF-CARE


Instructions:  Acetaminophen, Use of Ear Drops ([facility]), Using Ear Drops with a 

Wick ([facility]), Otitis Externa ([facility])


Additional Instructions: 


Maintain adequate fluid intake


Take meds as directed


tylenol/ibuprofen as needed


over the counter cold medication as needed for symptoms


F/u:  with your PCM in 2-3 days for a recheck


Consider consult with ENT for ongoing/worsening symptoms





Return to the ED with any fever, worsening pain, chest pain, neck pain/stiffness

, shortness of breath, cough, drooling, trouble swallowing/breathing, abdominal 

pain, n/v/d, rash, or worsening/concerning symptoms otherwise.


Referrals: 


MATTHEW RAYMOND MD [[facility]] - Follow up as needed

## 2017-09-29 ENCOUNTER — HOSPITAL ENCOUNTER (EMERGENCY)
Dept: HOSPITAL 62 - ER | Age: 47
Discharge: HOME | End: 2017-09-29
Payer: SELF-PAY

## 2017-09-29 VITALS — SYSTOLIC BLOOD PRESSURE: 161 MMHG | DIASTOLIC BLOOD PRESSURE: 91 MMHG

## 2017-09-29 DIAGNOSIS — I10: ICD-10-CM

## 2017-09-29 DIAGNOSIS — Z88.5: ICD-10-CM

## 2017-09-29 DIAGNOSIS — Z88.8: ICD-10-CM

## 2017-09-29 DIAGNOSIS — E11.9: ICD-10-CM

## 2017-09-29 DIAGNOSIS — L03.311: Primary | ICD-10-CM

## 2017-09-29 DIAGNOSIS — Z86.14: ICD-10-CM

## 2017-09-29 DIAGNOSIS — L40.0: ICD-10-CM

## 2017-09-29 DIAGNOSIS — J44.9: ICD-10-CM

## 2017-09-29 PROCEDURE — 0H97XZZ DRAINAGE OF ABDOMEN SKIN, EXTERNAL APPROACH: ICD-10-PCS | Performed by: PHYSICIAN ASSISTANT

## 2017-09-29 PROCEDURE — 10060 I&D ABSCESS SIMPLE/SINGLE: CPT

## 2017-09-29 PROCEDURE — 99283 EMERGENCY DEPT VISIT LOW MDM: CPT

## 2017-09-29 NOTE — ER DOCUMENT REPORT
ED General





- General


Chief Complaint: Abscess


Stated Complaint: ABSCESS


Time Seen by Provider: 17 19:54


TRAVEL OUTSIDE OF THE [place] IN LAST 30 DAYS: No





- HPI


Patient complains to provider of: tender swollen area within the pannus


Onset: Last week


Onset/Duration: Gradual, Persistent


Associated symptoms: Other - one site has been draining but the other has not 

and is very tender.  denies: Fever





- Related Data


Allergies/Adverse Reactions: 


 





buspirone HCl [From BuSpar] Allergy (Verified 17 17:59)


 


ondansetron [From Zofran (as hydrochloride)] Allergy (Verified 17 17:59)


 


tramadol [Tramadol] Allergy (Verified 17 17:59)


 











Past Medical History





- Social History


Smoking Status: Current Every Day Smoker


Family History: Reviewed & Not Pertinent





- Past Medical History


Cardiac Medical History: Reports: Hx Hypercholesterolemia, Hx Hypertension


   Denies: Hx Pulmonary Embolism


Pulmonary Medical History: Reports: Hx Bronchitis, Hx COPD - no home o2, Hx 

Sleep Apnea


   Denies: Hx Tuberculosis


Endocrine Medical History: Reports: Hx Diabetes Mellitus Type 2, Hx 

Hypothyroidism


Renal/ Medical History: Denies: Hx Peritoneal Dialysis


Musculoskeltal Medical History: Reports Hx Arthritis - chronic neck, lower back

, and knee pain, Reports Hx Musculoskeletal Trauma


Skin Medical History: Reports Hx MRSA, Reports Hx Psoriasis


Psychiatric Medical History: Reports: Hx Anxiety, Hx Attention Deficit 

Hyperactivity Disorder, Hx Bipolar Disorder, Hx Depression


Infectious Medical History: Reports: Hx MRSA


Past Surgical History: Reports: Hx Abdominal Surgery - Laparoscopy, Hx  

Section - x3, Hx Cholecystectomy, Hx Gynecologic Surgery - Ectopic pregnancy 

repair, Hx Oral Surgery, Hx Orthopedic Surgery - Carpal tunnel bilat, Hx Tubal 

Ligation





- Immunizations


Immunizations up to date: Yes


Hx Diphtheria, Pertussis, Tetanus Vaccination: Yes





Review of Systems





- Review of Systems


Constitutional: No symptoms reported


Skin: See HPI


-: Yes All other systems reviewed and negative





Physical Exam





- Vital signs


Vitals: 


 











Temp Pulse Resp BP Pulse Ox


 


 98.1 F   111 H  22 H  153/103 H  97 


 


 17 17:56  17 17:56  17 17:56  17 17:56  17 17:56














- Notes


Notes: 





PHYSICAL EXAM


GENERAL: Alert, interacts well but tearful


HEAD: Normocephalic, atraumatic.


EYES: Pupils equal, round, and reactive to light. Extraocular movements intact.


ENT: Oral mucosa moist, tongue midline. 


NECK: Full range of motion. Supple. Trachea midline.


LUNGS: Clear to auscultation bilaterally, no wheezes, rales, or rhonchi. No 

respiratory distress.


HEART: Regular rate and rhythm. No murmurs, gallops, or rubs.


ABDOMEN: Soft, morbidly obese, nondistended, nontender. No guarding, rebound, 

or rigidity.. Bowel sounds present in all 4 quadrants.


EXTREMITIES: Moves all 4 extremities spontaneously. No edema, radial and 

dorsalis pedis pulses 2/4 bilaterally. No cyanosis. 


NEUROLOGICAL: Alert and oriented x4. Normal speech.


PSYCH: Normal affect, normal mood.


SKIN: Warm, dry, normal turgor.  Patient with evidence of plaque psoriasis 

noted over her extremities.  Within her pannus there are 2 tender areas that 

are indurated without overlying erythema.  Mild fluctuation noted in both.  

Both measure approximately 1 cm in diameter








Course





- Re-evaluation


Re-evalutation: 





17 21:00


patient is a 46-year-old female who is hemodynamic stable, no acute distress 

afebrile.  presents today with concerns for abscesses.  Patient is noted to 

have cellulitis within her pannus.  2 areas were lanced without any drainage 

noted.  Patient initiated on Bactrim.  Patient to follow-up with her primary 

care provider.  Patient agrees with plan.  





- Vital Signs


Vital signs: 


 











Temp Pulse Resp BP Pulse Ox


 


 98.0 F   96   16   161/91 H  97 


 


 17 22:34  17 22:34  17 22:34  17 22:34  17 22:34














Procedures





- Incision and Drainage


  ** Abdomen


Type: Simple, Multiple - 2-left LLQ within pannus


Anesthetic type: 1% Lidocaine


mL's of anesthetic: 2


Blade size: 11


I&D procedure: Betadine prep applied


Incision Method: Incision made by scalpel


Amount/type of drainage: 0


Adult Front & Back picture: 


  __________________________














  __________________________





 1 - tendern indurated areas








Discharge





- Discharge


Clinical Impression: 


 Cellulitis





Condition: Good


Disposition: HOME, SELF-CARE


Instructions:  MRSA Cellulitis ([facility]), Post Incision and Drainage, Trimethoprim-

Sulfa ([facility])


Prescriptions: 


Methylprednisolone [Medrol Dosepack (4 mg/Tab) 21 Tab/Dosepak] 4 mg PO ASDIR 

PRN #21 tab.ds.pk


 PRN Reason: 


Sulfamethoxazole/Trimethoprim [Bactrim Ds Tablet] 2 each PO BID #20 tablet

## 2017-10-16 ENCOUNTER — HOSPITAL ENCOUNTER (EMERGENCY)
Dept: HOSPITAL 62 - ER | Age: 47
Discharge: HOME | End: 2017-10-16
Payer: SELF-PAY

## 2017-10-16 VITALS — DIASTOLIC BLOOD PRESSURE: 99 MMHG | SYSTOLIC BLOOD PRESSURE: 140 MMHG
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
## 2017-10-16 DIAGNOSIS — F17.200: ICD-10-CM

## 2017-10-16 DIAGNOSIS — M25.522: ICD-10-CM

## 2017-10-16 DIAGNOSIS — M25.512: ICD-10-CM
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
## 2017-10-16 DIAGNOSIS — M25.532: ICD-10-CM

## 2017-10-16 DIAGNOSIS — S62.002A: Primary | ICD-10-CM

## 2017-10-16 PROCEDURE — 99283 EMERGENCY DEPT VISIT LOW MDM: CPT

## 2017-10-16 PROCEDURE — 2W3DX1Z IMMOBILIZATION OF LEFT LOWER ARM USING SPLINT: ICD-10-PCS | Performed by: EMERGENCY MEDICINE

## 2017-10-16 NOTE — RADIOLOGY REPORT ([facility])
EXAM DESCRIPTION:  SHOULDER LEFT 2 OR MORE VIEWS



COMPLETED DATE/TIME:  10/16/2017 4:40 pm



REASON FOR STUDY:  FALL, PAIN



COMPARISON:  None.



NUMBER OF VIEWS:  Three views.



TECHNIQUE:  Internal rotation, external rotation, and Y view images acquired of the left shoulder.



LIMITATIONS:  None.



FINDINGS:  MINERALIZATION: Normal.

BONES: No acute fracture or dislocation.  No worrisome bone lesions.

JOINTS: No dislocation.

VISUALIZED LUNGS AND RIBS: No pneumothorax.  No rib fracture.

SOFT TISSUES: No radiopaque foreign body.

OTHER: No other significant finding.



IMPRESSION:  NEGATIVE STUDY OF THE LEFT SHOULDER. NO RADIOGRAPHIC EVIDENCE OF ACUTE INJURY.



TECHNICAL DOCUMENTATION:  JOB ID:  [id number]

 2011 [employer]- All Rights Reserved

## 2017-10-16 NOTE — ER DOCUMENT REPORT
ED Fall





- General


Chief Complaint: Fall


Stated Complaint: FALL SHOULDER PAIN


Mode of Arrival: Ambulatory


Information source: Patient


TRAVEL OUTSIDE OF THE [place] IN LAST 30 DAYS: No





- HPI


Occurred: Just prior to arrival


Where: Home


Context: Slipped


Associated symptoms: denies: Lost consciousness, Dazed/confused, Seizure, 

Difficulty breathing, Difficulty walking, Became dizzy/fainted, Blood in stool


Location of injury/pain: Elbow, Shoulder, Wrist


Quality of pain: Achy


Severity: Moderate


Notes: 





Patient arrives with complaints of left shoulder left elbow and left wrist pain 

after falling at home.  She states that she slipped on her top step due to it 

being wet and fell down 3 wooden steps.  She denies striking her head.  She 

denies blood thinners.  She denies loss of consciousness.  She complains of 

left shoulder left elbow and left wrist pain only.  She denies any neck, back, 

chest, abdominal pain.  She denies any numbness tingling or weakness.  No 

blurred or loss vision.  No fever.  She has no other complaints at this time.





- Related data


Allergies/Adverse Reactions: 


 





lurasidone [From Latuda] Allergy (Mild, Verified 10/16/17 15:31)


 Ataxia


buspirone HCl [From BuSpar] Allergy (Verified 10/16/17 15:31)


 


ondansetron [From Zofran (as hydrochloride)] Allergy (Verified 10/16/17 15:31)


 


tramadol [Tramadol] Allergy (Verified 10/16/17 15:31)


 











Past Medical History





- Social History


Smoking Status: Current Every Day Smoker


Chew tobacco use (# tins/day): No


Frequency of alcohol use: Occasional


Drug Abuse: None


Family History: Reviewed & Not Pertinent





- Past Medical History


Cardiac Medical History: Reports: Hx Hypercholesterolemia, Hx Hypertension


   Denies: Hx Pulmonary Embolism


Pulmonary Medical History: Reports: Hx Bronchitis, Hx COPD - no home o2, Hx 

Sleep Apnea


   Denies: Hx Tuberculosis


Endocrine Medical History: Reports: Hx Diabetes Mellitus Type 2, Hx 

Hypothyroidism


Renal/ Medical History: Denies: Hx Peritoneal Dialysis


Musculoskeltal Medical History: Reports Hx Arthritis - chronic neck, lower back

, and knee pain, Reports Hx Musculoskeletal Trauma


Skin Medical History: Reports Hx MRSA, Reports Hx Psoriasis


Psychiatric Medical History: Reports: Hx Anxiety, Hx Attention Deficit 

Hyperactivity Disorder, Hx Bipolar Disorder, Hx Depression


Infectious Medical History: Reports: Hx MRSA


Past Surgical History: Reports: Hx Abdominal Surgery - Laparoscopy, Hx  

Section - x3, Hx Cholecystectomy, Hx Gynecologic Surgery - Ectopic pregnancy 

repair, Hx Oral Surgery, Hx Orthopedic Surgery - Carpal tunnel bilat, Hx Tubal 

Ligation





- Immunizations


Immunizations up to date: Yes


Hx Diphtheria, Pertussis, Tetanus Vaccination: Yes





Review of Systems





- Review of Systems


-: Yes All other systems reviewed and negative





Physical Exam





- Vital signs


Vitals: 


 











Temp Pulse Resp BP Pulse Ox


 


 98.8 F   82   20   147/111 H  97 


 


 10/16/17 15:32  10/16/17 15:32  10/16/17 15:32  10/16/17 15:32  10/16/17 15:32














- Notes


Notes: 





GENERAL: alert, cooperative, nontoxic, no distress.


HEAD: normocephalic, atraumatic


EYES: conjunctiva pink without discharge, no external redness or swelling. 

[facility]. [facility]S INTACT


EARS: no external swelling, no external redness


NOSE: atraumatic, no external swelling


MOUTH/THROAT: mucous membranes moist and pink, posterior pharynx without 

erythema, swelling, exudate. No trismus or drooling.


NECK: soft, supple, full range of motion, no meningismus. NO MIDLINE TENDERNESS 

TO PALPATION, NO STEP-OFFS/CREPITUS. 


CHEST: no distress, lungs clear and equal throughout.  No wheezing, rales, 

rhonchi.


CARDIAC: regular rate and rhythm, no murmur, normal capillary refill, normal 

pulses.  No peripheral edema noted.


ABDOMEN: Soft, nontender.


BACK: full range of motion, no CVA tenderness.


EXTREMITIES: Tenderness to palpation of the left shoulder, left elbow, left 

wrist.  Slightly limited range of motion due to pain.  No redness, no swelling.

  Normal pulse.  Normal sensation.  Normal cap refill.  No snuffbox tenderness.

  No midline tenderness, step-offs or crepitus to palpation of the cervical, 

thoracic, lumbar spine.


NEURO: alert and oriented x 3, no focal deficits, full range of motion of all 

extremities.


PYSCH: appropriate mood, affect.  Patient is cooperative.


SKIN: pink, warm, dry, no rash.





Course





- Re-evaluation


Re-evalutation: 





10/16/17 17:10


Patient is nontoxic appearing with stable vitals.  The patient slipped and fell 

walking out of her house and injured her left arm.  X-rays of the left shoulder 

left elbow show no acute fracture.  X-rays of the left wrist show cystic change 

at the scaphoid.  The patient does have tenderness in this area, therefore she 

will be placed in a thumb spica splint and be instructed to follow-up with Sally Berry regarding this as it is possible she could have a fracture.  She also be 

placed in a sling for the shoulder.  Discharged home with [facility] for pain.  

Follow-up with Orth at the next available appointment.  Follow-up sooner for 

increased pain, fever, redness, swelling, any further concerns.





The patient is noted to have elevated blood pressure during today's emergency 

department visit.  The patient was informed of this finding.  The patient was 

instructed that this may be related to pre-hypertension and requires further 

evaluation with a primary care provider.  The patient has no hypertensive 

symptoms at this time.





The patient's emergency department workup and current diagnosis were explained 

to the patient and or family.  Follow-up instructions were provided.  

Medications if prescribed were discussed. Instructions for when to return to 

the emergency department including specific  worrisome symptoms were discussed 

with the patient and/or family.





- Vital Signs


Vital signs: 


 











Temp Pulse Resp BP Pulse Ox


 


 98.8 F   82   20   147/111 H  97 


 


 10/16/17 15:32  10/16/17 15:32  10/16/17 15:38  10/16/17 15:32  10/16/17 15:32














- Diagnostic Test


Radiology reviewed: Image reviewed, Reports reviewed - Shoulder and elbow films 

negative.  Left wrist shows cystic changes of the scaphoid, cannot rule out 

fracture.





Procedures





- Immobilization


  ** Left wrist


Pre-Proc Neuro Vasc Exam: Normal


Immobilizer type: Thumb spica


Performed by: PCT


Post-Proc Neuro Vasc Exam: Normal


Alignment checked and good: Yes





  ** Left arm


Pre-Proc Neuro Vasc Exam: Normal


Immobilizer type: Sling


Performed by: PCT


Post-Proc Neuro Vasc Exam: Normal


Alignment checked and good: Yes





Discharge





- Discharge


Clinical Impression: 


Fracture of scaphoid of left wrist


Qualifiers:


 Encounter type: initial encounter Scaphoid bone location: unspecified portion 

of scaphoid Fracture type: closed Fracture alignment: nondisplaced Qualified 

Code(s): S62.002A - Unspecified fracture of navicular [scaphoid] bone of left 

wrist, initial encounter for closed fracture





Left shoulder strain


Qualifiers:


 Encounter type: initial encounter Qualified Code(s): S46.912A - Strain of 

unspecified muscle, fascia and tendon at shoulder and upper arm level, left arm

, initial encounter





Condition: Stable


Disposition: HOME, SELF-CARE


Instructions:  Fractured Navicular of the Wrist ([facility]), Shoulder Injury ([facility])


Additional Instructions: 


Wear splint and sling until you follow-up with Orth [facility].  Call or through 

tomorrow to make a follow-up appointment at their next available appointment.  

Take medications as prescribed.  Ice the sore areas.  Follow-up if not better 

in 1 week, sooner for increased pain, fever, numbness, tingling, weakness, any 

further concerns.





Your blood pressure was elevated during today's visit.  Have this rechecked 

with your doctor.





The medication you were prescribed today may cause drowsiness.  Do not drive or 

operate heavy machinery while taking this medication.


Prescriptions: 


Hydrocodone/Acetaminophen [Norco 5-325 mg Tablet] 2 tab PO Q6H PRN #15 tab


 PRN Reason: 


Forms:  Elevated Blood Pressure


Referrals: 


MONTSERRAT ZAMORA DO [ACTIVE STAFF] - Follow up as needed

## 2017-10-16 NOTE — RADIOLOGY REPORT (SQ)
EXAM DESCRIPTION:  WRIST LEFT 3 VIEWS



COMPLETED DATE/TIME:  10/16/2017 4:40 pm



REASON FOR STUDY:  FALL, PAIN



COMPARISON:  None.



NUMBER OF VIEWS:  Three views.



TECHNIQUE:  AP, lateral, and oblique radiographic images acquired of the left wrist.



LIMITATIONS:  None.



FINDINGS:  MINERALIZATION: Normal.

BONES: There are some cystic changes in the scaphoid.  The appearance of the scaphoid on the AP view 
is concerning for fracture, but on the oblique and lateral views no fracture is appreciated.

SOFT TISSUES: No soft tissue swelling.  No foreign body.

OTHER: No other significant finding.



IMPRESSION:  Cystic changes in the scaphoid.  Somewhat unusual appearance on the AP view.  If there i
s clinical suspicion of a scaphoid fracture, consider CT for further evaluation.



TECHNICAL DOCUMENTATION:  JOB ID:  [id number]

 2011 [employer]- All Rights Reserved

## 2017-10-16 NOTE — RADIOLOGY REPORT (SQ)
EXAM DESCRIPTION:  ELBOW LEFT AP/LATERAL



COMPLETED DATE/TIME:  10/16/2017 4:40 pm



REASON FOR STUDY:  FALL, PAIN



COMPARISON:  None.



NUMBER OF VIEWS:  Two views.



TECHNIQUE:  AP and lateral radiographic images acquired of the left elbow.



LIMITATIONS:  None.



FINDINGS:  MINERALIZATION: Normal.

BONES: No acute fracture or dislocation.  No worrisome bone lesions.

JOINT: No effusion.

SOFT TISSUES: No soft tissue swelling.  No foreign body.

OTHER: No other significant finding.



IMPRESSION:  NEGATIVE STUDY OF THE LEFT ELBOW. NO RADIOGRAPHIC EVIDENCE OF ACUTE INJURY.



TECHNICAL DOCUMENTATION:  JOB ID:  [id number]

 2011 [employer]- All Rights Reserved

## 2018-01-20 ENCOUNTER — HOSPITAL ENCOUNTER (EMERGENCY)
Dept: HOSPITAL 62 - ER | Age: 48
Discharge: HOME | End: 2018-01-20
Payer: SELF-PAY

## 2018-01-20 VITALS — SYSTOLIC BLOOD PRESSURE: 157 MMHG | DIASTOLIC BLOOD PRESSURE: 85 MMHG
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
## 2018-01-20 DIAGNOSIS — M25.532: ICD-10-CM

## 2018-01-20 DIAGNOSIS — S62.002A: Primary | ICD-10-CM

## 2018-01-20 PROCEDURE — 99283 EMERGENCY DEPT VISIT LOW MDM: CPT

## 2018-01-20 PROCEDURE — 2W3DX1Z IMMOBILIZATION OF LEFT LOWER ARM USING SPLINT: ICD-10-PCS | Performed by: EMERGENCY MEDICINE

## 2018-01-20 NOTE — RADIOLOGY REPORT (SQ)
EXAM DESCRIPTION:  WRIST LEFT 3 VIEWS



COMPLETED DATE/TIME:  1/20/2018 3:42 pm



REASON FOR STUDY:  fall, pain



COMPARISON:  10/16/2017



NUMBER OF VIEWS:  Three views.



TECHNIQUE:  AP, lateral, and oblique radiographic images acquired of the left wrist.



LIMITATIONS:  None.



FINDINGS:  MINERALIZATION: Re- demonstration of cystic changes involving the scaphoid.

BONES: No acute fracture or dislocation.  Normal alignment.

SOFT TISSUES: No soft tissue swelling.  No foreign body.

OTHER: No other significant finding.



IMPRESSION:  Re- demonstration of cystic changes involving the scaphoid without evidence of acute fra
cture.



TECHNICAL DOCUMENTATION:  JOB ID:  [id number]

 2011 [employer]- All Rights Reserved

## 2018-01-20 NOTE — ER DOCUMENT REPORT
ED Hand/Wrist Injury





- General


Chief Complaint: Wrist Pain


Stated Complaint: LEFT WRIST PAIN


Time Seen by Provider: 18 15:06


Mode of Arrival: Ambulatory


Information source: Patient


TRAVEL OUTSIDE OF THE [place] IN LAST 30 DAYS: No





- HPI


Injury to: Wrist


Onset: Yesterday


Where: Home


Timing: Constant


Quality of pain: Achy


Severity: Moderate


Context: Fall


Notes: 





Patient arrives with complaints of left wrist pain status post fall yesterday 

evening.  She states that she was walking up the steps when she tripped and 

attempted to catch herself and injured her left wrist.  She continues to have 

pain in the left wrist.  Worse with any sort of movement, better with rest.  

She denies redness, fever, numbness, tingling, weakness.  She denies any nausea

, vomiting, diarrhea.  She denies any chest pain shortness of breath.  She 

denies striking her head.  She denies any other injuries or any other 

complaints at this time.





- Related Data


Allergies/Adverse Reactions: 


 





lurasidone [From Latuda] Allergy (Mild, Verified 10/16/17 15:31)


 Ataxia


buspirone HCl [From BuSpar] Allergy (Verified 10/16/17 15:31)


 


ondansetron [From Zofran (as hydrochloride)] Allergy (Verified 10/16/17 15:31)


 


tramadol [Tramadol] Allergy (Verified 10/16/17 15:31)


 











Past Medical History





- Social History


Smoking Status: Unknown if Ever Smoked


Family History: Reviewed & Not Pertinent





- Past Medical History


Cardiac Medical History: Reports: Hx Hypercholesterolemia, Hx Hypertension


   Denies: Hx Pulmonary Embolism


Pulmonary Medical History: Reports: Hx Bronchitis, Hx COPD - no home o2, Hx 

Sleep Apnea


   Denies: Hx Tuberculosis


Endocrine Medical History: Reports: Hx Diabetes Mellitus Type 2, Hx 

Hypothyroidism


Renal/ Medical History: Denies: Hx Peritoneal Dialysis


Musculoskeltal Medical History: Reports Hx Arthritis - chronic neck, lower back

, and knee pain, Reports Hx Musculoskeletal Trauma


Skin Medical History: Reports Hx MRSA, Reports Hx Psoriasis


Psychiatric Medical History: Reports: Hx Anxiety, Hx Attention Deficit 

Hyperactivity Disorder, Hx Bipolar Disorder, Hx Depression


Infectious Medical History: Reports: Hx MRSA


Past Surgical History: Reports: Hx Abdominal Surgery - Laparoscopy, Hx  

Section - x3, Hx Cholecystectomy, Hx Gynecologic Surgery - Ectopic pregnancy 

repair, Hx Oral Surgery, Hx Orthopedic Surgery - Carpal tunnel bilat, Hx Tubal 

Ligation





- Immunizations


Immunizations up to date: Yes


Hx Diphtheria, Pertussis, Tetanus Vaccination: Yes





Review of Systems





- Review of Systems


-: Yes All other systems reviewed and negative





Physical Exam





- Vital signs


Vitals: 


 











Temp Pulse Resp BP Pulse Ox


 


 98.5 F   94   14   141/83 H  97 


 


 18 14:39  18 14:39  18 14:39  18 14:39  18 14:39














- Notes


Notes: 





GENERAL: alert, cooperative, nontoxic, no distress.


HEAD: normocephalic, atraumatic


EYES: conjunctiva pink without discharge, no external redness or swelling.


EARS: no external swelling, no external redness


NOSE: atraumatic, no external swelling


MOUTH/THROAT: mucous membranes moist and pink


NECK: soft, supple, full range of motion, no meningismus.


CHEST: no distress, lungs clear and equal throughout.  No wheezing, rales, 

rhonchi.


CARDIAC: regular rate and rhythm, no murmur, normal capillary refill, normal 

pulses.  


BACK: full range of motion, no CVA tenderness.


EXTREMITIES: Tenderness to palpation of the left wrist just below the base of 

the thumb with positive snuffbox tenderness.  Pain with axial load of the 

thumb.  Slightly limited range of motion of the thumb secondary to pain.  No 

swelling, no redness.  Normal cap refill and sensation distally.  Normal 

pulses.  Compartments are soft.  Limited range of motion of the wrist secondary 

to pain.  Elbow exam is normal with no tenderness and full range of motion.  

Hand exam is normal with full range of motion and no tenderness.


NEURO: alert and oriented 3, no focal deficits, full range of motion of all 

extremities.


PYSCH: appropriate mood, affect.  Patient is cooperative.


SKIN: pink, warm, dry, no rash.








Course





- Re-evaluation


Re-evalutation: 





18 16:30


Patient is nontoxic in her stable vitals.  The patient tripped going up her 

steps injuring her left wrist.  She is noted to have snuffbox tenderness.  X-

ray showed no acute fracture but does show cystic changes of the scaphoid.  Due 

to her recent injury and the fact that she has snuffbox tenderness, the patient 

will be placed in a thumb spica splint for possible occult fracture.  She will 

be referred to Sally MAURER.  She will be given a small supply of pain medication for 

possible occult fracture with instructions to follow-up with Sally MAURER at the next 

available appointment.  Rest, ice, elevate.  Follow-up sooner for increased pain

, fever, numbness, tingling, weakness, any further concerns.





The patient is noted to have elevated blood pressure during today's emergency 

department visit.  The patient was informed of this finding.  The patient was 

instructed that this may be related to pre-hypertension and requires further 

evaluation with a primary care provider.  The patient has no hypertensive 

symptoms at this time.





The patient's emergency department workup and current diagnosis were explained 

to the patient and or family.  Follow-up instructions were provided.  

Medications if prescribed were discussed. Instructions for when to return to 

the emergency department including specific  worrisome symptoms were discussed 

with the patient and/or family.





- Vital Signs


Vital signs: 


 











Temp Pulse Resp BP Pulse Ox


 


 98.5 F   94   14   141/83 H  97 


 


 18 14:39  18 14:39  18 14:39  18 14:39  18 14:39














Procedures





- Immobilization


  ** Left wrist


Pre-Proc Neuro Vasc Exam: Normal


Immobilizer type: Thumb spica


Performed by: PCT


Post-Proc Neuro Vasc Exam: Normal


Alignment checked and good: Yes





Discharge





- Discharge


Clinical Impression: 


Scaphoid fracture, wrist, closed


Qualifiers:


 Encounter type: initial encounter Scaphoid bone location: unspecified portion 

of scaphoid Fracture alignment: nondisplaced Laterality: left Qualified Code(s)

: S62.002A - Unspecified fracture of navicular [scaphoid] bone of left wrist, 

initial encounter for closed fracture





Condition: Stable


Disposition: HOME, SELF-CARE


Instructions:  Fractured Navicular of the Wrist ([facility])


Additional Instructions: 


Wear splint until follow-up with [facility].  Rest, ice, elevate your hand.  Follow-

up with [facility] at the next available appointment.  Follow-up sooner for 

increased pain, fever, numbness, tingling, weakness, any further concerns.





Your blood pressure was elevated during today's visit.  Have this rechecked 

with your doctor.





The medication you were prescribed today may cause drowsiness.  Do not drive or 

operate heavy machinery while taking this medication.


Prescriptions: 


Diclofenac Sodium [Voltaren 50 Mg Tablet.] 50 mg PO BID #20 tablet.


Hydrocodone/Acetaminophen [Norco 5-325 mg Tablet] 1 tab PO Q6H PRN #6 tab


 PRN Reason: 


Forms:  Elevated Blood Pressure, Smoking Cessation Education


Referrals: 


MONTSERRAT ZAMORA DO [ACTIVE STAFF] - Follow up as needed

## 2018-03-11 ENCOUNTER — HOSPITAL ENCOUNTER (EMERGENCY)
Dept: HOSPITAL 62 - ER | Age: 48
Discharge: HOME | End: 2018-03-11
Payer: SELF-PAY

## 2018-03-11 VITALS — DIASTOLIC BLOOD PRESSURE: 89 MMHG | SYSTOLIC BLOOD PRESSURE: 155 MMHG

## 2018-03-11 DIAGNOSIS — Z86.14: ICD-10-CM

## 2018-03-11 DIAGNOSIS — Z90.49: ICD-10-CM

## 2018-03-11 DIAGNOSIS — M87.9: Primary | ICD-10-CM

## 2018-03-11 DIAGNOSIS — I10: ICD-10-CM

## 2018-03-11 DIAGNOSIS — F17.210: ICD-10-CM

## 2018-03-11 DIAGNOSIS — E78.00: ICD-10-CM
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
## 2018-03-11 PROCEDURE — 99283 EMERGENCY DEPT VISIT LOW MDM: CPT

## 2018-03-11 PROCEDURE — 2W3DX1Z IMMOBILIZATION OF LEFT LOWER ARM USING SPLINT: ICD-10-PCS | Performed by: NURSE PRACTITIONER

## 2018-03-11 PROCEDURE — 99406 BEHAV CHNG SMOKING 3-10 MIN: CPT

## 2018-03-11 NOTE — RADIOLOGY REPORT (SQ)
EXAM DESCRIPTION:  WRIST LEFT 3 VIEWS



COMPLETED DATE/TIME:  3/11/2018 6:03 pm



REASON FOR STUDY:  fall pain in wrist



COMPARISON:  1/20/2018.  2017.



NUMBER OF VIEWS:  Three views left wrist.



LIMITATIONS:  None.



FINDINGS:  Persistent abnormal appearance of the scaphoid.  Cystic changes or erosions with sclerosis
 in the distal pole.  Mild scapholunate interval widening.  Appearance is chronic and may reflect pre
vious scaphoid injury with AVN.  No acute fracture detected.

OTHER: No other significant finding.



IMPRESSION:  Persistent abnormal scaphoid, similar appearance to prior.  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
rauma with subsequent developing AVN.  Other than mild scapholunate interval widening, carpal alignme
nt looks normal.



TECHNICAL DOCUMENTATION:  JOB ID:  [id number]



Reading location - IP/workstation name: [email or web address]

## 2018-03-11 NOTE — ER DOCUMENT REPORT
ED Hand/Wrist Injury





- General


Chief Complaint: Wrist Injury


Stated Complaint: FALL/LEFT WRIST PAIN, SWELLING


Time Seen by Provider: 18 17:56


Mode of Arrival: Ambulatory


Information source: Patient


Notes: 





37-year-old female presents to ED for complaint of left wrist pain.  She states 

she fell over a tree stump in the morning and she also had an injury in January 

on the same wrist.  She states she did not follow-up with orthopedics after her 

last visit.  She states she left the splint on for a while and it took it off 

but the pain never really totally went away.


TRAVEL OUTSIDE OF THE [place] IN LAST 30 DAYS: No





- HPI


Injury to: Wrist


Onset: This morning


Where: Home, Indoors


Timing: Waxing and waning


Quality of pain: Achy, Throbbing


Severity: Moderate


Pain Level: 4


Context: Fall





- Related Data


Allergies/Adverse Reactions: 


 





lurasidone [From Latuda] Allergy (Mild, Verified 18 16:13)


 Ataxia


buspirone HCl [From BuSpar] Allergy (Verified 18 16:13)


 


ondansetron [From Zofran (as hydrochloride)] Allergy (Verified 18 16:13)


 


tramadol [Tramadol] Allergy (Verified 18 16:13)


 











Past Medical History





- General


Information source: Patient





- Social History


Smoking Status: Current Every Day Smoker


Cigarette use (# per day): Yes - Half pack per day


Chew tobacco use (# tins/day): No


Smoking Education Provided: Yes - 4 minutes


Frequency of alcohol use: None


Drug Abuse: None


Occupation: None


Lives with: Family


Family History: Reviewed & Not Pertinent


Patient has suicidal ideation: No


Patient has homicidal ideation: No





- Past Medical History


Cardiac Medical History: Reports: Hx Hypercholesterolemia, Hx Hypertension


Pulmonary Medical History: Reports: Hx Bronchitis, Hx COPD - no home o2, Hx 

Sleep Apnea


EENT Medical History: Reports: None


Neurological Medical History: Reports: None


Endocrine Medical History: Reports: Hx Diabetes Mellitus Type 2, Hx 

Hypothyroidism


Renal/ Medical History: Reports: None


Malignancy Medical History: Reports: None


GI Medical History: Reports: None


Musculoskeltal Medical History: Reports Hx Arthritis - chronic neck, lower back

, and knee pain, Reports Hx Musculoskeletal Trauma


Skin Medical History: Reports Hx MRSA, Reports Hx Psoriasis


Psychiatric Medical History: Reports: Hx Anxiety, Hx Attention Deficit 

Hyperactivity Disorder, Hx Bipolar Disorder, Hx Depression


Traumatic Medical History: Reports: Hx Fractures


Infectious Medical History: Reports: Hx MRSA


Past Surgical History: Reports: Hx Abdominal Surgery - Laparoscopy, Hx  

Section - x3, Hx Cholecystectomy, Hx Gynecologic Surgery - Ectopic pregnancy 

repair, Hx Oral Surgery, Hx Orthopedic Surgery - Carpal tunnel bilat, Hx Tubal 

Ligation





- Immunizations


Immunizations up to date: Yes


Hx Diphtheria, Pertussis, Tetanus Vaccination: Yes





Review of Systems





- Review of Systems


Constitutional: No symptoms reported


EENT: No symptoms reported


Cardiovascular: No symptoms reported


Respiratory: No symptoms reported


Gastrointestinal: No symptoms reported


Genitourinary: No symptoms reported


Female Genitourinary: No symptoms reported


Musculoskeletal: Joint pain - Left wrist pain and swelling


Skin: No symptoms reported


Hematologic/Lymphatic: No symptoms reported


Neurological/Psychological: No symptoms reported





Physical Exam





- Vital signs


Vitals: 


 











Temp Pulse Resp BP Pulse Ox


 


 98.6 F   99   22 H  139/82 H  99 


 


 18 16:32  18 16:32  18 16:32  18 16:32  18 16:32











Interpretation: Normal





- General


General appearance: Appears well, Alert





- HEENT


Head: Normocephalic, Atraumatic


Eyes: Normal


Pupils: PERRL





- Respiratory


Respiratory status: No respiratory distress


Chest status: Nontender


Breath sounds: Normal


Chest palpation: Normal





- Cardiovascular


Rhythm: Regular


Heart sounds: Normal auscultation


Murmur: No





- Abdominal


Inspection: Normal


Distension: No distension


Bowel sounds: Normal


Tenderness: Nontender


Organomegaly: No organomegaly





- Back


Back: Normal, Nontender





- Extremities


General upper extremity: Normal color, Normal temperature


General lower extremity: Normal inspection, Nontender, Normal color, Normal ROM

, Normal temperature, Normal weight bearing.  No: Deana's sign


Wrist: Tender, Axial load of thumb pain, Ecchymosis - Due to pain, Limited ROM.

  No: Abrasion, Deformity, Dislocation, Instability, Laceration, Navicular 

tenderness





- Neurological


Neuro grossly intact: Yes


Cognition: Normal


Orientation: AAOx4


[place]ie Coma Scale Eye Opening: Spontaneous


[place] Coma Scale Verbal: Oriented


[place] Coma Scale Motor: Obeys Commands


[place]ie Coma Scale Total: 15


Speech: Normal


Motor strength normal: LUE, RUE, LLE, RLE


Sensory: Normal





- Psychological


Associated symptoms: Normal affect, Normal mood





- Skin


Skin Temperature: Warm


Skin Moisture: Dry


Skin Color: Normal





Course





- Re-evaluation


Re-evalutation: 





18 02:04


X-ray discussed with Dr. Valencia.  He stated patient needed to follow-up in the 

office tomorrow.  Thumb spica splint applied to the wrist and patient was given 

a sling she was also treated with Tylenol and discharged home with a 

prescription for Norco and instructions to to follow-up with orthopedics.  

Patient explained that it is extremely important that she follows up because 

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 

own and needs to be repaired.  Patient verbalized understanding of instructions.











- Vital Signs


Vital signs: 


 











Temp Pulse Resp BP Pulse Ox


 


 98.0 F   82   22 H  155/89 H  95 


 


 18 19:42  18 19:42  18 16:32  18 19:42  18 19:42














- Diagnostic Test


Radiology reviewed: Image reviewed, Reports reviewed





Procedures





- Immobilization


  ** Left Wrist


Time completed: 19:25


Immobilizer type: Thumb spica, Sling


Performed by: PCT


Post-Proc Neuro Vasc Exam: Normal


Alignment checked and good: Yes





Discharge





- Discharge


Clinical Impression: 


 Avascular necrosis scaphoid left





Condition: Stable


Disposition: HOME, SELF-CARE


Additional Instructions: 


You have a previous trauma to the left wrist which has subsequently developed 

in the avascular necrosis of the scaphoid.





There is no acute injuries at this time





You were treated with a splint and instructed to follow-up with orthopedics 

with your last injury





You will have the split put back on your wrist but you need to follow-up with 

orthopedics





The longer you wait to follow-up with orthopedic the more damage will be done 

to your wrist.





Elevate your wrist and ice it and follow up with orthopedics by telephone 

tomorrow.








ICE & ELEVATION:


     Apply ice packs frequently against the painful area.  Many different 

schedules are recommended, such as "20 minutes on, 20 minutes off" or "one hour 

ice, two hours rest."  If you need to work, you may need to go longer between 

ice treatments.  You should plan to have the area ice packed AT LEAST one-

fourth of the time.


     The ice should be applied over the wrap, tape, or splint, or over a layer 

of cloth -- not directly against the skin.  Some ice bags have a built-in cloth 

and can be put directly on the skin.


     Your injured part should be elevated as much as possible over the next 48 

hours.  Try to keep the injury above the level of the heart. Avoid use of the 

injured area.  Elevation and rest will decrease the swelling.








USE OF OVER-THE-COUNTER IBUPROFEN:


     Ibuprofen (Advil, Nuprin, Medipren, Motrin IB) is a medication for fever 

and pain control.  In addition, it has anti- inflammatory effects which may be 

beneficial, especially in the treatment of injuries.


     It's best to take ibuprofen with food.  Persons with ulcer disease or 

allergy to aspirin should notify their physician of this before taking 

ibuprofen.


     Ibuprofen can be given every four to six hours, for a total of four doses 

daily.


     Age              Pain or fever dose          Antiinflammatory dose


     6-8 yr              200 mg (1 tab)                200 mg (1 tab)


     9-11 yr             200 mg (1 tab)                200-400 mg (1-2 tab)


     11-14 yr            200-400 mg (1-2 tab)         400 mg (2 tab)


     15-adult            400 mg (2 tab)                600 mg (3 tab)








ORAL NARCOTIC MEDICATION:


     You have been given a narcotic for pain control.  This medication is a 

narcotic.  It's best taken with food, as nausea can result if taken on an empty 

stomach.


     Don't operate machinery or drive within six hours of taking this 

medication.  Do not combine this medicine with alcohol, or with any medication 

which can cause sedation (such as cold tablets or sleeping pills) unless you 

get permission from the physician.


     Narcotics tend to cause constipation.  If possible, drink plenty of fluids 

and eat a diet high in fiber and fruits.





     Please be aware that prescription narcotics also have the potential for 

abuse.  People become addicted to these medications because of the general 

sense of wellbeing that they induce.  This feeling along with a significant 

reduction in tension, anxiety, and aggression provides a stimulating seductive 

quality to these drugs.  Once your pain is under control, we encourage you to 

discard your unused narcotics.








FOLLOW-UP CARE:


If you have been referred to a physician for follow-up care, call the physician

s office for an appointment as you were instructed or within the next two days.

  If you experience worsening or a significant change in your symptoms, notify 

the physician immediately or return to the Emergency Department at any time for 

re-evaluation.


Prescriptions: 


Hydrocodone/Acetaminophen [Norco 5-325 mg Tablet] 1 tab PO Q6HP PRN #7 tablet


 PRN Reason: 


Forms:  Elevated Blood Pressure, Smoking Cessation Education


Referrals: 


ZOE VALENCIA MD [ACTIVE STAFF] - Follow up tomorrow

## 2018-03-27 ENCOUNTER — HOSPITAL ENCOUNTER (EMERGENCY)
Dept: HOSPITAL 62 - ER | Age: 48
Discharge: HOME | End: 2018-03-27
Payer: SELF-PAY

## 2018-03-27 VITALS — SYSTOLIC BLOOD PRESSURE: 167 MMHG | DIASTOLIC BLOOD PRESSURE: 91 MMHG

## 2018-03-27 DIAGNOSIS — F17.200: ICD-10-CM

## 2018-03-27 DIAGNOSIS — I10: ICD-10-CM

## 2018-03-27 DIAGNOSIS — E11.9: ICD-10-CM

## 2018-03-27 DIAGNOSIS — M87.9: ICD-10-CM

## 2018-03-27 DIAGNOSIS — G89.29: Primary | ICD-10-CM

## 2018-03-27 DIAGNOSIS — J44.9: ICD-10-CM

## 2018-03-27 DIAGNOSIS — Z88.5: ICD-10-CM

## 2018-03-27 DIAGNOSIS — Z88.8: ICD-10-CM

## 2018-03-27 DIAGNOSIS — M25.532: ICD-10-CM

## 2018-03-27 PROCEDURE — 99283 EMERGENCY DEPT VISIT LOW MDM: CPT

## 2018-03-27 NOTE — ER DOCUMENT REPORT
ED Hand/Wrist Injury





- General


Chief Complaint: Wrist Pain


Stated Complaint: WRIST PAIN


Time Seen by Provider: 18 13:19


Mode of Arrival: Ambulatory


Information source: Patient


TRAVEL OUTSIDE OF THE [place] IN LAST 30 DAYS: No





- HPI


Patient complains to provider of: Left wrist pain


Notes: 





Patient is here with complaints of left wrist pain.  The patient apparently had 

a fall back in January where she apparently fractured her scaphoid.  She never 

followed up with orthopedics.  She was seen here 2 weeks ago after slipping and 

injuring her wrist again was noted to have avascular necrosis of the scaphoid 

bone with some widening of the carpals.  She was placed in a thumb spica splint 

she was instructed to follow-up with orthopedics the following day in the 

office.  Patient does not have insurance, therefore she was not able to follow-

up with orthopedics.  She is waiting to be seen by the [facility] and tells me 

that she has an appointment scheduled in the next week or so.  She is here 

today because she is out of her pain medication she was given at her last visit 

and is requesting a refill.  She denies any new injuries.  She denies any 

fevers.  She denies any nausea, vomiting, diarrhea.  No numbness, tingling, 

weakness.  She denies any other complaints at this time.





- Related Data


Allergies/Adverse Reactions: 


 





lurasidone [From Latuda] Allergy (Mild, Verified 18 13:13)


 Ataxia


buspirone HCl [From BuSpar] Allergy (Verified 18 13:13)


 


ondansetron [From Zofran (as hydrochloride)] Allergy (Verified 18 13:13)


 


tramadol [Tramadol] Allergy (Verified 18 13:13)


 











Past Medical History





- Social History


Smoking Status: Current Every Day Smoker


Chew tobacco use (# tins/day): No


Frequency of alcohol use: None


Drug Abuse: None


Family History: Reviewed & Not Pertinent


Patient has suicidal ideation: No


Patient has homicidal ideation: No





- Past Medical History


Cardiac Medical History: Reports: Hx Hypercholesterolemia, Hx Hypertension


   Denies: Hx Pulmonary Embolism


Pulmonary Medical History: Reports: Hx Bronchitis, Hx COPD - no home o2, Hx 

Sleep Apnea


   Denies: Hx Tuberculosis


Endocrine Medical History: Reports: Hx Diabetes Mellitus Type 2, Hx 

Hypothyroidism


Renal/ Medical History: Denies: Hx Peritoneal Dialysis


Musculoskeltal Medical History: Reports Hx Arthritis - chronic neck, lower back

, and knee pain, Reports Hx Musculoskeletal Trauma


Skin Medical History: Reports Hx MRSA, Reports Hx Psoriasis


Psychiatric Medical History: Reports: Hx Anxiety, Hx Attention Deficit 

Hyperactivity Disorder, Hx Bipolar Disorder, Hx Depression


Traumatic Medical History: Reports: Hx Fractures


Infectious Medical History: Reports: Hx MRSA


Past Surgical History: Reports: Hx Abdominal Surgery - Laparoscopy, Hx  

Section - x3, Hx Cholecystectomy, Hx Gynecologic Surgery - Ectopic pregnancy 

repair, Hx Oral Surgery, Hx Orthopedic Surgery - Carpal tunnel bilat, Hx Tubal 

Ligation





- Immunizations


Immunizations up to date: Yes


Hx Diphtheria, Pertussis, Tetanus Vaccination: Yes





Review of Systems





- Review of Systems


-: Yes All other systems reviewed and negative





Physical Exam





- Notes


Notes: 





GENERAL: alert, cooperative, nontoxic, no distress.


HEAD: normocephalic, atraumatic


EYES: conjunctiva pink without discharge, no external redness or swelling.


EARS: no external swelling, no external redness


NOSE: atraumatic, no external swelling


MOUTH/THROAT: mucous membranes moist and pink


NECK: soft, supple, full range of motion, no meningismus.


CHEST: no distress, lungs clear and equal throughout.  No wheezing, rales, 

rhonchi.


CARDIAC: regular rate and rhythm, no murmur, normal capillary refill, normal 

pulses.  


BACK: full range of motion, no CVA tenderness.


EXTREMITIES: Thumb spica splint in place to the left wrist.  Normal cap refill 

and sensation distally.


NEURO: alert and oriented 3, no focal deficits, full range of motion of all 

extremities.


PYSCH: appropriate mood, affect.  Patient is cooperative.


SKIN: pink, warm, dry, no rash.








Course





- Re-evaluation


Re-evalutation: 





18 14:57


Patient is nontoxic appearing with stable vitals.  She is here with complaints 

of left wrist pain.  She had an original fracture back in January it was never 

properly cared for and then reinjured her wrist a couple weeks ago.  Last 

visits x-ray showed avascular necrosis of the scaphoid.  She was placed in a 

thumb spica and was supposed to follow-up with orthopedics, she does not have 

insurance was unable to follow-up with orthopedics.  She is here requesting a 

refill on her pain medication until she gets in to see the [facility] 

regarding her wrist.  She denies any new injuries.  Exam shows normal cap 

refill and sensation.  She has had no new injury, therefore no repeat x-rays 

required at this time.  I offered the patient a shot of Toradol since she 

states that ibuprofen does not seem to be controlling her pain, she states she 

does not like shots and would prefer to not get a shot for pain medication.  

This point the patient will be discharged home with a prescription for 

meloxicam.  She has an appointment scheduled with the [facility] which she was 

instructed to keep.  Continue to wear her splint.  Rest, ice, elevate.  Follow-

up sooner for any worsening symptoms or any further concerns.





The patient is noted to have elevated blood pressure during today's emergency 

department visit.  The patient was informed of this finding.  The patient was 

instructed that this may be related to pre-hypertension and requires further 

evaluation with a primary care provider.  The patient has no hypertensive 

symptoms at this time.





The patient's emergency department workup and current diagnosis were explained 

to the patient and or family.  Follow-up instructions were provided.  

Medications if prescribed were discussed. Instructions for when to return to 

the emergency department including specific  worrisome symptoms were discussed 

with the patient and/or family.





Discharge





- Discharge


Clinical Impression: 


 Chronic pain of left wrist





Condition: Stable


Disposition: HOME, SELF-CARE


Instructions:  Fractured Navicular of the Wrist ([facility]), Chronic Pain Control ([facility]

)


Additional Instructions: 


Take medications as prescribed.  Follow-up with your doctor as scheduled.  

Follow-up sooner for worsening pain, fever, numbness, tingling, weakness, any 

further concerns.





Your blood pressure was elevated during today's visit.  Have this rechecked 

with your doctor.


Prescriptions: 


Meloxicam 7.5 mg PO DAILY #15 tablet


Forms:  Elevated Blood Pressure, Smoking Cessation Education


Referrals: 


[facility] [Provider Group] - Follow up as needed


MONTSERRAT ZAMORA DO [ACTIVE STAFF] - Follow up as needed

## 2018-04-10 ENCOUNTER — HOSPITAL ENCOUNTER (OUTPATIENT)
Dept: HOSPITAL 62 - CCC | Age: 48
End: 2018-04-10
Payer: COMMERCIAL

## 2018-04-10 DIAGNOSIS — E66.9: ICD-10-CM

## 2018-04-10 DIAGNOSIS — L40.8: Primary | ICD-10-CM

## 2018-04-10 LAB
ADD MANUAL DIFF: NO
ALBUMIN SERPL-MCNC: 3.7 G/DL (ref 3.5–5)
ALP SERPL-CCNC: 85 U/L (ref 38–126)
ALT SERPL-CCNC: 40 U/L (ref 9–52)
ANION GAP SERPL CALC-SCNC: 7 MMOL/L (ref 5–19)
AST SERPL-CCNC: 35 U/L (ref 14–36)
BASOPHILS # BLD AUTO: 0 10^3/UL (ref 0–0.2)
BASOPHILS NFR BLD AUTO: 0.7 % (ref 0–2)
BILIRUB DIRECT SERPL-MCNC: 0.4 MG/DL (ref 0–0.4)
BILIRUB SERPL-MCNC: 0.5 MG/DL (ref 0.2–1.3)
BUN SERPL-MCNC: 9 MG/DL (ref 7–20)
CALCIUM: 9.1 MG/DL (ref 8.4–10.2)
CHLORIDE SERPL-SCNC: 105 MMOL/L (ref 98–107)
CHOLEST SERPL-MCNC: 242.94 MG/DL (ref 0–200)
CO2 SERPL-SCNC: 30 MMOL/L (ref 22–30)
EOSINOPHIL # BLD AUTO: 0.1 10^3/UL (ref 0–0.6)
EOSINOPHIL NFR BLD AUTO: 2.4 % (ref 0–6)
ERYTHROCYTE [DISTWIDTH] IN BLOOD BY AUTOMATED COUNT: 17.4 % (ref 11.5–14)
GLUCOSE SERPL-MCNC: 95 MG/DL (ref 75–110)
HCT VFR BLD CALC: 40.1 % (ref 36–47)
HGB BLD-MCNC: 12.9 G/DL (ref 12–15.5)
LDLC SERPL DIRECT ASSAY-MCNC: 181 MG/DL (ref ?–100)
LYMPHOCYTES # BLD AUTO: 1.3 10^3/UL (ref 0.5–4.7)
LYMPHOCYTES NFR BLD AUTO: 22.3 % (ref 13–45)
MCH RBC QN AUTO: 25.2 PG (ref 27–33.4)
MCHC RBC AUTO-ENTMCNC: 32.1 G/DL (ref 32–36)
MCV RBC AUTO: 78 FL (ref 80–97)
MONOCYTES # BLD AUTO: 0.3 10^3/UL (ref 0.1–1.4)
MONOCYTES NFR BLD AUTO: 4.8 % (ref 3–13)
NEUTROPHILS # BLD AUTO: 4.1 10^3/UL (ref 1.7–8.2)
NEUTS SEG NFR BLD AUTO: 69.8 % (ref 42–78)
PLATELET # BLD: 202 10^3/UL (ref 150–450)
POTASSIUM SERPL-SCNC: 4.4 MMOL/L (ref 3.6–5)
PROT SERPL-MCNC: 8.3 G/DL (ref 6.3–8.2)
RBC # BLD AUTO: 5.12 10^6/UL (ref 3.72–5.28)
SODIUM SERPL-SCNC: 142.4 MMOL/L (ref 137–145)
TOTAL CELLS COUNTED % (AUTO): 100 %
TRIGL SERPL-MCNC: 99 MG/DL (ref ?–150)
VLDLC SERPL CALC-MCNC: 20 MG/DL (ref 10–31)
WBC # BLD AUTO: 5.9 10^3/UL (ref 4–10.5)

## 2018-04-10 PROCEDURE — 84443 ASSAY THYROID STIM HORMONE: CPT

## 2018-04-10 PROCEDURE — 80061 LIPID PANEL: CPT

## 2018-04-10 PROCEDURE — 36415 COLL VENOUS BLD VENIPUNCTURE: CPT

## 2018-04-10 PROCEDURE — 80053 COMPREHEN METABOLIC PANEL: CPT

## 2018-04-10 PROCEDURE — 85025 COMPLETE CBC W/AUTO DIFF WBC: CPT

## 2018-04-10 PROCEDURE — 83036 HEMOGLOBIN GLYCOSYLATED A1C: CPT

## 2019-09-09 ENCOUNTER — HOSPITAL ENCOUNTER (EMERGENCY)
Dept: HOSPITAL 62 - ER | Age: 49
Discharge: LEFT BEFORE DISCHARGE | End: 2019-09-09
Payer: COMMERCIAL

## 2019-09-09 VITALS — SYSTOLIC BLOOD PRESSURE: 169 MMHG | DIASTOLIC BLOOD PRESSURE: 77 MMHG

## 2019-09-09 DIAGNOSIS — Z53.21: Primary | ICD-10-CM

## 2019-10-24 ENCOUNTER — HOSPITAL ENCOUNTER (OUTPATIENT)
Dept: HOSPITAL 62 - RAD | Age: 49
End: 2019-10-24
Attending: FAMILY MEDICINE
Payer: MEDICAID

## 2019-10-24 DIAGNOSIS — M51.17: Primary | ICD-10-CM

## 2019-10-24 PROCEDURE — 72110 X-RAY EXAM L-2 SPINE 4/>VWS: CPT

## 2019-10-24 NOTE — RADIOLOGY REPORT (SQ)
EXAM DESCRIPTION:  L SPINE WHOLE



COMPLETED DATE/TIME:  10/24/2019 12:20 pm



REASON FOR STUDY:  LUMBAGO WITH SCIATICA



COMPARISON:  None.



NUMBER OF VIEWS:  Five views including obliques.



TECHNIQUE:  AP, lateral, oblique, and sacral radiographic images acquired of the lumbar spine.



LIMITATIONS:  None.



FINDINGS:  MINERALIZATION: Normal.

SEGMENTATION: Normal.  No transitional anatomy.

ALIGNMENT: Normal.

VERTEBRAE: Maintained height.  No fracture or worrisome bone lesion.

DISCS: Mild disc narrowing at L4-5 and L5-S1.

POSTERIOR ELEMENTS: Mild hypertrophic facet changes at L5-S1.

HARDWARE: None in the spine.

PARASPINAL SOFT TISSUES: Normal.

PELVIS: Intact as visualized. No fractures or worrisome bone lesions. SI joints intact.

OTHER: No other significant finding.



IMPRESSION:  Mild degenerative disc disease and facet arthropathy.



TECHNICAL DOCUMENTATION:  JOB ID:  [id number]

 [employer]- All Rights Reserved



Reading location - IP/workstation name: [email or web address]

## 2020-01-13 ENCOUNTER — HOSPITAL ENCOUNTER (EMERGENCY)
Dept: HOSPITAL 62 - ER | Age: 50
Discharge: HOME | End: 2020-01-13
Payer: MEDICAID

## 2020-01-13 VITALS — SYSTOLIC BLOOD PRESSURE: 149 MMHG | DIASTOLIC BLOOD PRESSURE: 83 MMHG

## 2020-01-13 DIAGNOSIS — E11.9: ICD-10-CM

## 2020-01-13 DIAGNOSIS — M25.571: ICD-10-CM

## 2020-01-13 DIAGNOSIS — I10: ICD-10-CM

## 2020-01-13 DIAGNOSIS — J44.9: ICD-10-CM

## 2020-01-13 DIAGNOSIS — S93.491A: Primary | ICD-10-CM
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
## 2020-01-13 DIAGNOSIS — F17.200: ICD-10-CM

## 2020-01-13 PROCEDURE — 99284 EMERGENCY DEPT VISIT MOD MDM: CPT

## 2020-01-13 PROCEDURE — 82962 GLUCOSE BLOOD TEST: CPT

## 2020-01-13 PROCEDURE — 72100 X-RAY EXAM L-S SPINE 2/3 VWS: CPT

## 2020-01-13 PROCEDURE — 2W3QX1Z IMMOBILIZATION OF RIGHT LOWER LEG USING SPLINT: ICD-10-PCS

## 2020-01-13 NOTE — ER DOCUMENT REPORT
ED General





- General


Chief Complaint: Ankle Pain


Stated Complaint: RIGHT ANKLE PAIN


Time Seen by Provider: 20 15:06


Primary Care Provider: 


ZOE VALENCIA MD [ACTIVE STAFF] - Follow up in 1 week (In 1 week if not better)


DIANNA FULTON DO [Primary Care Provider] - Follow up as needed


TRAVEL OUTSIDE OF THE [place] IN LAST 30 DAYS: No





- Related Data


Allergies/Adverse Reactions: 


                                        





lurasidone [From Latuda] Allergy (Mild, Verified 19 10:42)


   Ataxia


buspirone HCl [From BuSpar] Allergy (Verified 19 10:42)


   


ondansetron [From Zofran (as hydrochloride)] Allergy (Verified 19 10:42)


   


tramadol [Tramadol] Allergy (Verified 19 10:42)


   











Past Medical History





- Social History


Smoking Status: Unknown if Ever Smoked


Family History: Reviewed & Not Pertinent


Patient has suicidal ideation: No


Patient has homicidal ideation: No





- Past Medical History


Cardiac Medical History: Reports: Hx Hypercholesterolemia, Hx Hypertension


   Denies: Hx Pulmonary Embolism


Pulmonary Medical History: Reports: Hx Bronchitis, Hx COPD - no home o2, Hx 

Sleep Apnea


   Denies: Hx Tuberculosis


Endocrine Medical History: Reports: Hx Diabetes Mellitus Type 2, Hx 

Hypothyroidism


Renal/ Medical History: Denies: Hx Peritoneal Dialysis


Musculoskeletal Medical History: Reports Hx Arthritis - chronic neck, lower 

back, and knee pain, Reports Hx Musculoskeletal Trauma


Skin Medical History: Reports Hx MRSA, Reports Hx Psoriasis


Psychiatric Medical History: Reports: Hx Anxiety, Hx Attention Deficit 

Hyperactivity Disorder, Hx Bipolar Disorder, Hx Depression


Traumatic Medical History: Reports: Hx Fractures


Infectious Medical History: Reports: Hx MRSA


Past Surgical History: Reports: Hx Abdominal Surgery - Laparoscopy, Hx  

Section - x3, Hx Cholecystectomy, Hx Gynecologic Surgery - Ectopic pregnancy 

repair, Hx Oral Surgery, Hx Orthopedic Surgery - Carpal tunnel bilat, Hx Tubal 

Ligation





- Immunizations


Immunizations up to date: Yes


Hx Diphtheria, Pertussis, Tetanus Vaccination: Yes





Physical Exam





- Vital signs


Vitals: 


                                        











Temp Pulse Resp BP Pulse Ox


 


 98.6 F   106 H  18   133/65 H  96 


 


 20 14:41  20 14:41  20 14:41  20 14:41  20 14:41














- Notes


Notes: 





Patient reported by paramedics status post fall.  She says the part of the floor

 gave way and she fell twisting her right ankle falling backwards hitting her 

lower back.  Did not hit her head had no preceding chest pain or dizziness.  

Denies any headache neck pain chest pain shortness of breath nausea vomiting 

abdominal pain no numbness or weakness in lower extremities.  Pain does get 

worse with movements.  Patient reports not able to weight-bear.





Past medical history is pertinent for recent diagnosed with diabetes based on 

elevated A1c hypertension COPD psoriasis and psychiatric issues.


She has not seen her family doctor yet for her diabetes.





Social history does smoke does not drink.





Review of systems pertinent positives and negatives in HPI otherwise all the 

systems were reviewed and acutely negative as reports increasing thirst frequent

 urination and that she is been drinking lots of water








PHYSICIAN EXAM -vital signs are noted triage note and note from triage reviewed


 


GENERAL: Well-appearing, well-nourished and in __mild distress from pain____


 


HEAD: Atraumatic, normocephalic.


 


EYES: Pupils equal round and reactive to light, extraocular movements intact, 

sclera anicteric, conjunctiva are normal.


 


ENT: nares patent, oropharynx clear without exudates.  Slightly dry mucous 

membranes.  Face is nontender


 


NECK:  supple without lymphadenopathy neck is nontender midline full range of 

motion without pain


 


LUNGS: Good breath sounds bilaterally with an occasional wheeze


 


HEART: Regular rate and rhythm without murmurs on my examination


 


ABDOMEN: Soft, nontender, normoactive bowel sounds.  Morbidly obese


 


EXTREMITIES: Upper extremity shoulder elbow and wrist are nontender left lower 

extremity the knee and ankle nontender.  The right lower extremity the knee and 

hip are nontender.  She got tenderness of the medial lateral aspect of the ankle

 with minimal swelling there is no tenderness of the fifth metatarsal and the 

rest of the foot is nontender she has good sensation.  She has positive edema 

bilaterally to about the knees which he says is chronic there is no palpable 

cords


 


NEUROLOGICAL: Alert and oriented x4.  Cranial nerves he has symmetrical smile 

facies and shoulder shrug.  His motor strength is 5/5 bilaterally in the upper 

and lower extremities.  Toes downgoing.  Sensation is intact to light touch is a

 negative Romberg and normal gait


 


PSYCH: Normal mood, normal affect.


 


SKIN: Warm, Dry, normal turgor, she is some scattered rashes consistent with her

 psoriasis





BACK-nontender in the midline





Differential diagnosis dehydration fracture contusion sprain





Course





- Re-evaluation


Re-evalutation: 





20 16:46


ED patient remained stable neurologically intact after evaluating patient I r

ecommended we obtain laboratory studies and give IV fluids determine where sugar

 was and necessary start on medications she was agreeable to this but then later

 refused this morning get her x-rays and go home.





Procedure a posterior splint and sugar tong splint was applied by nursing staff 

supervised by me with good neurovascular status





Medical decision making patient presents as post fall with a right ankle sprain 

and contusion to her back.  She has no neurological deficits looks well can be 

discharged home.  Patient is morbidly obese and I think she will do well with 

crutches in case she has a walker at home because of this the splint was 

reinforced over did emphasize to her that she is really not able to weight-bear 

on this.  Is is able to use the splint to move around.  She needs to rest and 

keep the leg elevated ice with orthopedic follow-up in 1 week if not better also

 strongly emphasized need to get into see her family doctor soon as possible 

treatment for her sugar she will be given a short course of Vicodin for her pain

 and what about the side effects of the medication patient has been on 

hydrocodone in the past





At this time there is no indication for admission.  I have discussed the 

findings with patient/family with return precautions and follow-up 

recommendations.  Verbal discharge instructions given at the bedside and 

opportunity for questions given.  Medication warnings were given if indicated. 

Patient is in agreement with this plan and has verbalized understanding of 

return precautions and the need for primary care follow-up as directed..


20 17:06





20 17:31


Addendum patient was amenable to an Accu-Chek which was done prior to discharge 

it was 110 there is no indication for treatment at this time however the 

emphasized she follow-up with her family doctor because of the elevated 

hemoglobin A1c in the past





- Vital Signs


Vital signs: 


                                        











Temp Pulse Resp BP Pulse Ox


 


 98.6 F   106 H  18   133/65 H  96 


 


 20 14:41  20 14:41  20 14:41  20 14:41  20 14:41














- Diagnostic Test


Radiology reviewed: Reports reviewed





Discharge





- Discharge


Clinical Impression: 


Right ankle sprain


Qualifiers:


 Encounter type: initial encounter Involved ligament of ankle: other ligament 

Qualified Code(s): S93.491A - Sprain of other ligament of right ankle, initial 

encounter





Back contusion


Qualifiers:


 Encounter type: initial encounter 





Condition: Good


Disposition: HOME, SELF-CARE


Instructions:  Ankle Stirrup Splint ([facility]), Ice & Elevation ([facility]), Ice Packs 

([facility]), Oral Narcotic Medication ([facility]), Splint Precautions ([facility]), Sprained Ankle 

([facility])


Additional Instructions: 





Please review the discharge instructions, they will tell you about your 

disease/injury and what you need to return to the ED for





Return to the ED if you feel worse or can follow-up with your family doctor





The pain medications may cause drowsiness.  Be careful if you are using 

crutches.  Do not drive or operate machinery.  Do not take Tylenol with the pain

 medication





IT IS VERY IMPORTANT THAT YOU FOLLOWUP WITH YOUR PMD in 3 to 5 days to recheck 

your sugar





Make sure you drink plenty fluids





The splint was not designed for weightbearing only to help you move around you 

should still try to stay off the leg as much as possible





You can remove the splint in 5 days and try to put weight on the ankle if still 

painful wrap the ankle with Ace wrap and follow-up with orthopedics


Prescriptions: 


Hydrocodone/Acetaminophen [Norco 5-325 mg Tablet] 1 tab PO ASDIR PRN #10 tablet


 PRN Reason: 


Referrals: 


DIANNA FULTON DO [Primary Care Provider] - Follow up as needed


ZOE VALENCIA MD [ACTIVE STAFF] - Follow up in 1 week (In 1 week if not better)

## 2020-01-13 NOTE — RADIOLOGY REPORT (SQ)
EXAM DESCRIPTION:  L SPINE 2 VIEWS



COMPLETED DATE/TIME:  1/13/2020 4:00 pm



REASON FOR STUDY:  Trauma



COMPARISON:  10/24/2019



NUMBER OF VIEWS:  Two views.



TECHNIQUE:  AP and lateral radiographic images acquired of the lumbar spine.



LIMITATIONS:  None.



FINDINGS:  MINERALIZATION: Normal.

SEGMENTATION: Normal.  No transitional anatomy.

ALIGNMENT: Normal.

VERTEBRAE: Maintained height.  No fracture or worrisome bone lesion.

DISCS: Preserved height.  No significant osteophytes or end plate irregularity.

POSTERIOR ELEMENTS: Pedicles and facets are intact.  No pars defect or posterior arch defects.

HARDWARE: None in the spine.

PARASPINAL SOFT TISSUES: Normal.

PELVIS: Intact as visualized.  No fractures or worrisome bone lesions.  SI joints intact.

OTHER: No other significant finding.



IMPRESSION:  NORMAL 2 VIEW LUMBAR SPINE.



TECHNICAL DOCUMENTATION:  JOB ID:  [id number]

 2011 [employer]- All Rights Reserved



Reading location - IP/workstation name: [email or web address]

## 2020-01-13 NOTE — RADIOLOGY REPORT (SQ)
EXAM DESCRIPTION:  ANKLE RIGHT COMPLETE



COMPLETED DATE/TIME:  1/13/2020 2:57 pm



REASON FOR STUDY:  pain and injury



COMPARISON:  None.



NUMBER OF VIEWS:  Three views.



TECHNIQUE:  AP, lateral, and oblique radiographic images acquired of the right ankle.



LIMITATIONS:  None.



FINDINGS:  MINERALIZATION: Normal.

BONES: No acute fracture or dislocation.  No worrisome bone lesions.

JOINTS: No effusions.

SOFT TISSUES: Diffuse soft tissue edema.

OTHER: No other significant finding.



IMPRESSION:  Diffuse soft tissue edema.  No underlying bony abnormality.



TECHNICAL DOCUMENTATION:  JOB ID:  [id number]

 2011 [employer]- All Rights Reserved



Reading location - IP/workstation name: [email or web address]

## 2020-05-22 ENCOUNTER — HOSPITAL ENCOUNTER (EMERGENCY)
Dept: HOSPITAL 62 - ER | Age: 50
Discharge: HOME | End: 2020-05-22
Payer: MEDICAID

## 2020-05-22 VITALS — SYSTOLIC BLOOD PRESSURE: 132 MMHG | DIASTOLIC BLOOD PRESSURE: 69 MMHG

## 2020-05-22 DIAGNOSIS — E11.65: Primary | ICD-10-CM

## 2020-05-22 DIAGNOSIS — R00.0: ICD-10-CM

## 2020-05-22 DIAGNOSIS — Z88.8: ICD-10-CM

## 2020-05-22 DIAGNOSIS — Z79.84: ICD-10-CM

## 2020-05-22 DIAGNOSIS — E66.01: ICD-10-CM

## 2020-05-22 DIAGNOSIS — I10: ICD-10-CM

## 2020-05-22 DIAGNOSIS — R53.83: ICD-10-CM

## 2020-05-22 DIAGNOSIS — R63.1: ICD-10-CM

## 2020-05-22 DIAGNOSIS — F17.200: ICD-10-CM

## 2020-05-22 LAB
ADD MANUAL DIFF: NO
ALBUMIN SERPL-MCNC: 3.8 G/DL (ref 3.5–5)
ALP SERPL-CCNC: 123 U/L (ref 38–126)
ANION GAP SERPL CALC-SCNC: 11 MMOL/L (ref 5–19)
APPEARANCE UR: (no result)
APTT PPP: YELLOW S
AST SERPL-CCNC: 26 U/L (ref 14–36)
BASE EXCESS BLDV CALC-SCNC: 0.5 MMOL/L
BASOPHILS # BLD AUTO: 0.1 10^3/UL (ref 0–0.2)
BASOPHILS NFR BLD AUTO: 1.2 % (ref 0–2)
BILIRUB DIRECT SERPL-MCNC: 0 MG/DL (ref 0–0.4)
BILIRUB SERPL-MCNC: 0.5 MG/DL (ref 0.2–1.3)
BILIRUB UR QL STRIP: NEGATIVE
BUN SERPL-MCNC: 13 MG/DL (ref 7–20)
CALCIUM: 9.4 MG/DL (ref 8.4–10.2)
CHLORIDE SERPL-SCNC: 99 MMOL/L (ref 98–107)
CO2 SERPL-SCNC: 21 MMOL/L (ref 22–30)
EOSINOPHIL # BLD AUTO: 0.1 10^3/UL (ref 0–0.6)
EOSINOPHIL NFR BLD AUTO: 0.8 % (ref 0–6)
ERYTHROCYTE [DISTWIDTH] IN BLOOD BY AUTOMATED COUNT: 19.1 % (ref 11.5–14)
GLUCOSE SERPL-MCNC: 360 MG/DL (ref 75–110)
GLUCOSE UR STRIP-MCNC: >=500 MG/DL
HCO3 BLDV-SCNC: 27.5 MMOL/L (ref 20–32)
HCT VFR BLD CALC: 42.7 % (ref 36–47)
HGB BLD-MCNC: 13.7 G/DL (ref 12–15.5)
KETONES UR STRIP-MCNC: NEGATIVE MG/DL
LYMPHOCYTES # BLD AUTO: 1.8 10^3/UL (ref 0.5–4.7)
LYMPHOCYTES NFR BLD AUTO: 17 % (ref 13–45)
MCH RBC QN AUTO: 25.4 PG (ref 27–33.4)
MCHC RBC AUTO-ENTMCNC: 32.2 G/DL (ref 32–36)
MCV RBC AUTO: 79 FL (ref 80–97)
MONOCYTES # BLD AUTO: 0.5 10^3/UL (ref 0.1–1.4)
MONOCYTES NFR BLD AUTO: 4.4 % (ref 3–13)
NEUTROPHILS # BLD AUTO: 8.2 10^3/UL (ref 1.7–8.2)
NEUTS SEG NFR BLD AUTO: 76.6 % (ref 42–78)
NITRITE UR QL STRIP: NEGATIVE
PCO2 BLDV: 53.1 MMHG (ref 35–63)
PH BLDV: 7.33 [PH] (ref 7.3–7.42)
PH UR STRIP: 7 [PH] (ref 5–9)
PLATELET # BLD: 215 10^3/UL (ref 150–450)
POTASSIUM SERPL-SCNC: 4.7 MMOL/L (ref 3.6–5)
PROT SERPL-MCNC: 7.5 G/DL (ref 6.3–8.2)
PROT UR STRIP-MCNC: NEGATIVE MG/DL
RBC # BLD AUTO: 5.4 10^6/UL (ref 3.72–5.28)
SP GR UR STRIP: 1.01
TOTAL CELLS COUNTED % (AUTO): 100 %
UROBILINOGEN UR-MCNC: NEGATIVE MG/DL (ref ?–2)
WBC # BLD AUTO: 10.8 10^3/UL (ref 4–10.5)

## 2020-05-22 PROCEDURE — 82803 BLOOD GASES ANY COMBINATION: CPT

## 2020-05-22 PROCEDURE — 85025 COMPLETE CBC W/AUTO DIFF WBC: CPT

## 2020-05-22 PROCEDURE — 80053 COMPREHEN METABOLIC PANEL: CPT

## 2020-05-22 PROCEDURE — 99285 EMERGENCY DEPT VISIT HI MDM: CPT

## 2020-05-22 PROCEDURE — 82962 GLUCOSE BLOOD TEST: CPT

## 2020-05-22 PROCEDURE — 96361 HYDRATE IV INFUSION ADD-ON: CPT

## 2020-05-22 PROCEDURE — 81001 URINALYSIS AUTO W/SCOPE: CPT

## 2020-05-22 PROCEDURE — 36415 COLL VENOUS BLD VENIPUNCTURE: CPT

## 2020-05-22 PROCEDURE — 96360 HYDRATION IV INFUSION INIT: CPT

## 2020-05-22 NOTE — ER DOCUMENT REPORT
ED Blood Sugar Problem





- General


Chief Complaint: High Blood Sugar


Stated Complaint: BLOOD SUGAR PROBLEMS


Time Seen by Provider: 20 14:14


Primary Care Provider: 


DIANNA FULTON DO [Primary Care Provider] - Follow up as needed


Mode of Arrival: Medic


Notes: 





CHIEF COMPLAINT: Hyperglycemia





HPI: 49-year-old morbidly obese female presenting to the emergency department at

the recommendation of her PCP for evaluation of elevated blood sugars over the 

last 4 days.  Reports increased thirst and tiredness.  Denies fever denies cough

denies shortness of breath denies abdominal pain denies nausea denies vomiting 

denies diarrhea denies dysuria.  Patient states that she takes metformin 500 mg 

only once daily.  She does have the ability to check her blood sugars at home.  

States they have been elevated above 200 over the last 4 days.  Patient states 

they normally run in the 120-130 range.





ROS: See HPI - all other systems were reviewed and are otherwise negative


Constitutional: no fever, increased tiredness


Eyes: no drainage, no blurred vision


ENT: no runny nose, no sore throat, increased thirst


Cardiovascular:  no chest pain 


Resp: no SOB, no cough


GI: no vomiting, no diarrhea, no abdominal pain


: no dysuria


Integumentary: no rash 


Allergy: no hives 


Musculoskeletal: no extremity pain or swelling 


Neurological: no numbness/tingling, no weakness





MEDICATIONS: I agree with the patient medications as charted by the RN.





ALLERGIES: I agree with the allergies as charted by the RN.





PAST MEDICAL HISTORY/PAST SURGICAL HISTORY: Reviewed and agree as charted by RN.





SOCIAL HISTORY: Reviewed and agree as charted by RN.





FAMILY HISTORY: No significant familial comorbid conditions directly related to 

patient complaint





EXAM:


Reviewed vital signs as charted by RN.


CONSTITUTIONAL: Alert and oriented and responds appropriately to questions. 

Well-appearing; well-nourished


HEAD: Normocephalic; atraumatic


EYES: PERRL; Conjunctivae clear, sclerae non-icteric


ENT: normal nose; no rhinorrhea; moist mucous membranes; pharynx without lesions

noted, no uvula edema or deviation, no tonsillar hypertrophy, phonation normal


NECK: Supple without meningismus; non-tender; no cervical lymphadenopathy, no 

masses


CARD: Mild tachycardia; no murmurs, no clicks, no rubs, no gallops; symmetric 

distal pulses


RESP: Normal chest excursion without splinting or tachypnea; breath sounds clear

and equal bilaterally; no wheezes, no rhonchi, no rales, pulse oximetry 99% on 

room air not hypoxic


ABD/GI: morbidly obese normal bowel sounds; non-distended; soft, non-tender, no 

rebound, no guarding; no palpable organomegaly or masses.


BACK:  The back appears normal and is non-tender to palpation, there is no CVA 

tenderness


EXT: Normal ROM in all joints; non-tender to palpation; no cyanosis, no effusion

s, no edema   


SKIN: Normal color for age and race; warm; dry; good turgor; psoriatic lesions 

noted generally


NEURO: Moves all extremities equally; Motor and sensory function intact 


PSYCH: The patient's mood and manner are appropriate. Grooming and personal 

hygiene are appropriate.





MDM: 49-year-old morbidly obese female uncontrolled diabetes.  Only taking 

metformin once daily.  Patient was mildly tachycardic initially.  Heart rate has

improved slightly, giving additional IV fluids.  Had a long conversation with 

the patient about weight loss and diet.  Her diabetes will likely remain 

uncontrolled given her body habitus unless she loses weight, she will talk to 

her PCP about a nutritionist referral.  Patient ate a big Mac and French fries 

today prior to coming to the emergency department we discussed the importance of

diet and blood sugar management.  Patient's labs show hyperglycemia but did not 

show evidence of DKA.  If patient's heart rate and blood sugar improved we will 

plan to discharge home with instruction to increase metformin to 500 mg twice 

daily, check blood sugars twice daily and follow-up with her PCP in 4 days for 

reevaluation.  We did discuss the signs and symptoms of hypoglycemia


TRAVEL OUTSIDE OF THE [place] IN LAST 30 DAYS: No





- Related Data


Allergies/Adverse Reactions: 


                                        





lurasidone [From Latuda] Allergy (Mild, Verified 20 15:40)


   Ataxia


buspirone HCl [From BuSpar] Allergy (Verified 20 15:40)


   


ondansetron [From Zofran (as hydrochloride)] Allergy (Verified 20 15:40)


   


tramadol [Tramadol] Allergy (Verified 20 15:40)


   











Past Medical History





- General


Information source: Patient





- Social History


Smoking Status: Current Every Day Smoker


Frequency of alcohol use: None


Drug Abuse: None


Family History: Reviewed & Not Pertinent


Patient has homicidal ideation: No





- Past Medical History


Cardiac Medical History: Reports: Hx Hypercholesterolemia, Hx Hypertension


   Denies: Hx Pulmonary Embolism


Pulmonary Medical History: Reports: Hx Bronchitis, Hx COPD, Hx Sleep Apnea


   Denies: Hx Tuberculosis


Endocrine Medical History: Reports: Hx Diabetes Mellitus Type 2, Hx 

Hypothyroidism


Renal/ Medical History: Denies: Hx Peritoneal Dialysis


Musculoskeletal Medical History: Reports Hx Arthritis - chronic neck, lower 

back, and knee pain, Reports Hx Musculoskeletal Trauma


Skin Medical History: Reports Hx MRSA, Reports Hx Psoriasis


Psychiatric Medical History: Reports: Hx Anxiety, Hx Attention Deficit 

Hyperactivity Disorder, Hx Bipolar Disorder, Hx Depression


Traumatic Medical History: Reports: Hx Fractures


Infectious Medical History: Reports: Hx MRSA


Past Surgical History: Reports: Hx Abdominal Surgery - Laparoscopy, Hx  

Section - x3, Hx Cholecystectomy, Hx Gynecologic Surgery - Ectopic pregnancy 

repair, Hx Oral Surgery, Hx Orthopedic Surgery - Carpal tunnel bilat, Hx Tubal 

Ligation





- Immunizations


Immunizations up to date: Yes


Hx Diphtheria, Pertussis, Tetanus Vaccination: Yes





Physical Exam





- Vital signs


Vitals: 


                                        











Temp Pulse Resp BP Pulse Ox


 


 98.1 F   117 H  22 H  117/89 H  95 


 


 20 14:03  20 14:03  20 14:03  20 14:03  20 14:03














Course





- Re-evaluation


Re-evalutation: 





20 16:48


Patient blood sugar has improved to 60 after 1 L normal saline, still mildly 

tachycardic with a heart rate of 110, has no respiratory complaints.  Will give 

additional IV fluids recheck heart rate


20 18:08


Still mildly tachycardic with a heart rate of approximately 110.  She is in 

absolutely no distress patient is requesting to go home.  She is not in DKA.  We

 will have her follow-up with mild tachycardia with her PCP





- Vital Signs


Vital signs: 


                                        











Temp Pulse Resp BP Pulse Ox


 


 98.0 F   110 H  20   109/63   96 


 


 20 16:41  20 16:41  20 16:41  20 16:41  20 16:41














- Laboratory


Result Diagrams: 


                                 20 13:33





                                 20 13:33


Laboratory results interpreted by me: 


                                        











  20





  13:33 13:33 14:36


 


WBC  10.8 H  


 


RBC  5.40 H  


 


MCV  79 L  


 


MCH  25.4 L  


 


RDW  19.1 H  


 


Sodium   130.8 L 


 


Carbon Dioxide   21 L 


 


Glucose   360 H 


 


POC Glucose    310 H


 


Urine Glucose (UA)   














  20





  14:52 16:39


 


WBC  


 


RBC  


 


MCV  


 


MCH  


 


RDW  


 


Sodium  


 


Carbon Dioxide  


 


Glucose  


 


POC Glucose   260 H


 


Urine Glucose (UA)  >=500 H 














Discharge





- Discharge


Clinical Impression: 


 Hyperglycemia, Tachycardia





Condition: Stable


Disposition: HOME, SELF-CARE


Instructions:  Hyperglycemia ([facility])


Additional Instructions: 


Increase your metformin to 500 mg twice daily.  Check your blood sugars twice 

daily.  If you notice that your blood sugars are below 120 go back to taking 

metformin only once daily.  Follow-up with your primary care provider in 4 days 

for reevaluation of your blood sugars and to determine whether you need to be on

 metformin twice daily on a more continuous nature.  Diet is very important and 

blood sugar control speak with your primary care provider about a referral to 

nutritionist to discuss diet changes to help you lose weight.  This will help 

improve your overall health, your diabetes, your glycemic control and your 

longevity.


Referrals: 


DIANNA FULTON DO [Primary Care Provider] - Follow up as needed

## 2020-05-22 NOTE — ER DOCUMENT REPORT
ED Medical Screen (RME)





- General


Stated Complaint: BLOOD SUGAR PROBLEMS


Time Seen by Provider: 20 14:14


Primary Care Provider: 


DIANNA FULTON DO [Primary Care Provider] - Follow up as needed


Mode of Arrival: Medic


Information source: Patient


Notes: 





This 49-year-old diabetic, COPD, hypertension morbidly obese female presents 

emergency department with complaints of feeling tired and thirsty for the past 

week.  Reports her blood glucose has been elevated for the last couple days.  

Her primary care provider told her to come to the emergency department.  EMS did

give her a bolus of fluids.  Patient denies fever vomiting diarrhea.  Reports 

she ate a big Mac and French fries for lunch today.  She took her metformin as 

prescribed this morning.











I have greeted and performed a rapid initial assessment of this patient.  A 

comprehensive ED assessment and evaluation of the patient, analysis of test 

results and completion of the medical decision making process will be conducted 

by additional ED providers.








TRAVEL OUTSIDE OF THE [place] IN LAST 30 DAYS: No





- Related Data


Allergies/Adverse Reactions: 


                                        





lurasidone [From Latuda] Allergy (Mild, Verified 19 10:42)


   Ataxia


buspirone HCl [From BuSpar] Allergy (Verified 19 10:42)


   


ondansetron [From Zofran (as hydrochloride)] Allergy (Verified 19 10:42)


   


tramadol [Tramadol] Allergy (Verified 19 10:42)


   











Past Medical History





- Past Medical History


Cardiac Medical History: Reports: Hx Hypercholesterolemia, Hx Hypertension


   Denies: Hx Pulmonary Embolism


Pulmonary Medical History: Reports: Hx Bronchitis, Hx COPD - no home o2, Hx 

Sleep Apnea


   Denies: Hx Tuberculosis


Endocrine Medical History: Reports: Hx Diabetes Mellitus Type 2, Hx 

Hypothyroidism


Renal/ Medical History: Denies: Hx Peritoneal Dialysis


Musculoskeltal Medical History: Reports Hx Arthritis - chronic neck, lower back,

 and knee pain, Reports Hx Musculoskeletal Trauma


Skin Medical History: Reports Hx MRSA, Reports Hx Psoriasis


Psychiatric Medical History: Reports: Hx Anxiety, Hx Attention Deficit 

Hyperactivity Disorder, Hx Bipolar Disorder, Hx Depression


Traumatic Medical History: Reports: Hx Fractures


Infectious Medical History: Reports: Hx MRSA


Past Surgical History: Reports: Hx Abdominal Surgery - Laparoscopy, Hx  

Section - x3, Hx Cholecystectomy, Hx Gynecologic Surgery - Ectopic pregnancy 

repair, Hx Oral Surgery, Hx Orthopedic Surgery - Carpal tunnel bilat, Hx Tubal 

Ligation





- Immunizations


Immunizations up to date: Yes


Hx Diphtheria, Pertussis, Tetanus Vaccination: Yes





Physical Exam





- Vital signs


Vitals: 


                                        











Temp Pulse Resp BP Pulse Ox


 


 98.1 F   117 H  22 H  117/89 H  95 


 


 20 14:03  20 14:03  20 14:03  20 14:03  20 14:03














Course





- Vital Signs


Vital signs: 


                                        











Temp Pulse Resp BP Pulse Ox


 


 98.1 F   117 H  22 H  117/89 H  95 


 


 20 14:14  20 14:03  20 14:03  20 14:03  20 14:03














- Laboratory


Result Diagrams: 


                                 20 13:33





                                 20 13:33


Laboratory results interpreted by me: 


                                        











  20





  13:33


 


WBC  10.8 H


 


RBC  5.40 H


 


MCV  79 L


 


MCH  25.4 L


 


RDW  19.1 H














Doctor's Discharge





- Discharge


Referrals: 


DIANNA FULTON DO [Primary Care Provider] - Follow up as needed

## 2023-12-04 ENCOUNTER — APPOINTMENT (OUTPATIENT)
Facility: HOSPITAL | Age: 53
End: 2023-12-04
Payer: MEDICAID

## 2023-12-04 ENCOUNTER — HOSPITAL ENCOUNTER (EMERGENCY)
Facility: HOSPITAL | Age: 53
Discharge: HOME | End: 2023-12-04
Payer: MEDICAID

## 2023-12-04 VITALS
DIASTOLIC BLOOD PRESSURE: 71 MMHG | TEMPERATURE: 98.3 F | BODY MASS INDEX: 55.32 KG/M2 | RESPIRATION RATE: 18 BRPM | WEIGHT: 293 LBS | OXYGEN SATURATION: 96 % | HEART RATE: 88 BPM | HEIGHT: 61 IN | SYSTOLIC BLOOD PRESSURE: 144 MMHG

## 2023-12-04 DIAGNOSIS — M25.531 RIGHT WRIST PAIN: Primary | ICD-10-CM
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
## 2023-12-04 PROCEDURE — 73110 X-RAY EXAM OF WRIST: CPT

## 2023-12-04 PROCEDURE — 72220 X-RAY EXAM SACRUM TAILBONE: CPT

## 2023-12-04 PROCEDURE — 99283 EMERGENCY DEPT VISIT LOW MDM: CPT

## 2023-12-04 PROCEDURE — 72100 X-RAY EXAM L-S SPINE 2/3 VWS: CPT

## 2023-12-04 ASSESSMENT — ENCOUNTER SYMPTOMS
DIARRHEA: 0
NAUSEA: 0
COUGH: 0
SHORTNESS OF BREATH: 0
ABDOMINAL PAIN: 0
CHEST TIGHTNESS: 0
VOMITING: 0
CONSTIPATION: 0
BACK PAIN: 1

## 2023-12-04 NOTE — ED PROVIDER NOTES
XR SACRUM COCCYX (MIN 2 VIEWS)   Final Result      No acutely displaced sacrococcygeal fracture. XR LUMBAR SPINE (2-3 VIEWS)   Final Result      No acute fracture or listhesis of the lumbar spine. Medical Decision Making   I am the first provider for this patient. I reviewed the vital signs, available nursing notes, past medical history, past surgical history, family history and social history. Vital Signs-Reviewed the patient's vital signs. Pulse Oximetry Analysis -  96 on room air (Interpretation)    Records Reviewed: Prior medical records(Time of Review: 1:03 PM)    ED Course: Progress Notes, Reevaluation, and Consults:  DDx: Coccyx fracture, sacral fracture, wrist fracture, contusion, strain    Provider Notes (Medical Decision Making):   Patient is a 71-year-old female presenting to the emergency department due to low back pain and right wrist pain following a fall. On exam, patient is alert, oriented x3, no acute distress. Heart regular rate and rhythm. No murmurs appreciated. No lower extremity edema. Lungs clear to auscultation bilaterally. Abdomen is soft and nontender. Minimal tenderness noted at the very base of the lumbar spine in the area of the sacrum and coccyx. Right upper extremity is neurovascularly intact. No swelling or deformities noted. ROM intact. Minimal tenderness on palpation of the wrist.  Will obtain x-ray of the lumbar spine, sacrum/coccyx, and right wrist.    X-rays without significant findings. Patient instructed alternate Tylenol and ibuprofen as needed for pain. instructed to alternate ice and heat to decrease pain and swelling. Instructed to follow-up with PCP within the next 2 days in order to be reevaluated. Educated on signs and symptoms to monitor for and given strict return precautions. Patient displays understanding and is in agreement with plan and discharge at this time.     Proformative was called to provide ride back to

## 2023-12-04 NOTE — ED TRIAGE NOTES
Pt ambulatory to ED with c/o lower back pain and right wrist pain.  Pt states she fell earlier today       Pt lives at [facility]  [phone] (please call to  )

## 2023-12-05 NOTE — DISCHARGE INSTRUCTIONS
Alternate tylenol and ibuprofen as needed for pain. Alternate ice and heat to decrease pain and swelling. Follow-up with PCP within the next few days in order to be re-evaluated. Return to the ED with any new or worsening symptoms.